# Patient Record
Sex: MALE | Race: WHITE | Employment: OTHER | URBAN - NONMETROPOLITAN AREA
[De-identification: names, ages, dates, MRNs, and addresses within clinical notes are randomized per-mention and may not be internally consistent; named-entity substitution may affect disease eponyms.]

---

## 2017-03-06 ENCOUNTER — TELEPHONE (OUTPATIENT)
Dept: CARDIOLOGY | Age: 67
End: 2017-03-06

## 2017-03-07 ENCOUNTER — TELEPHONE (OUTPATIENT)
Dept: CARDIOLOGY | Age: 67
End: 2017-03-07

## 2017-06-01 ENCOUNTER — OFFICE VISIT (OUTPATIENT)
Dept: CARDIOLOGY | Age: 67
End: 2017-06-01
Payer: MEDICARE

## 2017-06-01 VITALS
HEART RATE: 63 BPM | DIASTOLIC BLOOD PRESSURE: 88 MMHG | SYSTOLIC BLOOD PRESSURE: 136 MMHG | BODY MASS INDEX: 22.72 KG/M2 | HEIGHT: 74 IN | WEIGHT: 177 LBS

## 2017-06-01 DIAGNOSIS — E78.2 MIXED HYPERLIPIDEMIA: ICD-10-CM

## 2017-06-01 DIAGNOSIS — I48.0 PAROXYSMAL ATRIAL FIBRILLATION (HCC): Primary | ICD-10-CM

## 2017-06-01 DIAGNOSIS — I10 ESSENTIAL HYPERTENSION: ICD-10-CM

## 2017-06-01 PROCEDURE — 1036F TOBACCO NON-USER: CPT | Performed by: CLINICAL NURSE SPECIALIST

## 2017-06-01 PROCEDURE — 93000 ELECTROCARDIOGRAM COMPLETE: CPT | Performed by: CLINICAL NURSE SPECIALIST

## 2017-06-01 PROCEDURE — 3017F COLORECTAL CA SCREEN DOC REV: CPT | Performed by: CLINICAL NURSE SPECIALIST

## 2017-06-01 PROCEDURE — G8419 CALC BMI OUT NRM PARAM NOF/U: HCPCS | Performed by: CLINICAL NURSE SPECIALIST

## 2017-06-01 PROCEDURE — 4040F PNEUMOC VAC/ADMIN/RCVD: CPT | Performed by: CLINICAL NURSE SPECIALIST

## 2017-06-01 PROCEDURE — 99213 OFFICE O/P EST LOW 20 MIN: CPT | Performed by: CLINICAL NURSE SPECIALIST

## 2017-06-01 PROCEDURE — G8427 DOCREV CUR MEDS BY ELIG CLIN: HCPCS | Performed by: CLINICAL NURSE SPECIALIST

## 2017-06-01 PROCEDURE — 1123F ACP DISCUSS/DSCN MKR DOCD: CPT | Performed by: CLINICAL NURSE SPECIALIST

## 2017-06-01 ASSESSMENT — ENCOUNTER SYMPTOMS
SHORTNESS OF BREATH: 0
ORTHOPNEA: 0
VOMITING: 0
NAUSEA: 0
BLURRED VISION: 0
BLOOD IN STOOL: 0
COUGH: 0
HEARTBURN: 0

## 2017-06-09 ENCOUNTER — OFFICE VISIT (OUTPATIENT)
Dept: CARDIOLOGY | Age: 67
End: 2017-06-09
Payer: MEDICARE

## 2017-06-09 DIAGNOSIS — I48.0 PAROXYSMAL ATRIAL FIBRILLATION (HCC): Primary | ICD-10-CM

## 2017-06-09 PROCEDURE — 3017F COLORECTAL CA SCREEN DOC REV: CPT | Performed by: CLINICAL NURSE SPECIALIST

## 2017-06-09 PROCEDURE — G8428 CUR MEDS NOT DOCUMENT: HCPCS | Performed by: CLINICAL NURSE SPECIALIST

## 2017-06-09 PROCEDURE — G8419 CALC BMI OUT NRM PARAM NOF/U: HCPCS | Performed by: CLINICAL NURSE SPECIALIST

## 2017-06-09 PROCEDURE — 93000 ELECTROCARDIOGRAM COMPLETE: CPT | Performed by: CLINICAL NURSE SPECIALIST

## 2017-06-09 PROCEDURE — 99211 OFF/OP EST MAY X REQ PHY/QHP: CPT | Performed by: CLINICAL NURSE SPECIALIST

## 2017-06-09 PROCEDURE — 4040F PNEUMOC VAC/ADMIN/RCVD: CPT | Performed by: CLINICAL NURSE SPECIALIST

## 2017-08-02 DIAGNOSIS — I48.91 ATRIAL FIBRILLATION, UNSPECIFIED TYPE (HCC): ICD-10-CM

## 2017-08-02 RX ORDER — RIVAROXABAN 20 MG/1
TABLET, FILM COATED ORAL
Qty: 30 TABLET | Refills: 5 | Status: SHIPPED | OUTPATIENT
Start: 2017-08-02 | End: 2018-02-15 | Stop reason: SDUPTHER

## 2017-12-01 ENCOUNTER — OFFICE VISIT (OUTPATIENT)
Dept: CARDIOLOGY | Age: 67
End: 2017-12-01
Payer: MEDICARE

## 2017-12-01 VITALS
DIASTOLIC BLOOD PRESSURE: 80 MMHG | HEIGHT: 74 IN | BODY MASS INDEX: 24.38 KG/M2 | HEART RATE: 86 BPM | WEIGHT: 190 LBS | SYSTOLIC BLOOD PRESSURE: 130 MMHG

## 2017-12-01 DIAGNOSIS — I10 ESSENTIAL HYPERTENSION: ICD-10-CM

## 2017-12-01 DIAGNOSIS — I48.0 PAROXYSMAL ATRIAL FIBRILLATION (HCC): Primary | ICD-10-CM

## 2017-12-01 DIAGNOSIS — E78.2 MIXED HYPERLIPIDEMIA: ICD-10-CM

## 2017-12-01 PROCEDURE — 3017F COLORECTAL CA SCREEN DOC REV: CPT | Performed by: CLINICAL NURSE SPECIALIST

## 2017-12-01 PROCEDURE — 4040F PNEUMOC VAC/ADMIN/RCVD: CPT | Performed by: CLINICAL NURSE SPECIALIST

## 2017-12-01 PROCEDURE — G8427 DOCREV CUR MEDS BY ELIG CLIN: HCPCS | Performed by: CLINICAL NURSE SPECIALIST

## 2017-12-01 PROCEDURE — 99213 OFFICE O/P EST LOW 20 MIN: CPT | Performed by: CLINICAL NURSE SPECIALIST

## 2017-12-01 PROCEDURE — 1036F TOBACCO NON-USER: CPT | Performed by: CLINICAL NURSE SPECIALIST

## 2017-12-01 PROCEDURE — G8420 CALC BMI NORM PARAMETERS: HCPCS | Performed by: CLINICAL NURSE SPECIALIST

## 2017-12-01 PROCEDURE — G8484 FLU IMMUNIZE NO ADMIN: HCPCS | Performed by: CLINICAL NURSE SPECIALIST

## 2017-12-01 PROCEDURE — 1123F ACP DISCUSS/DSCN MKR DOCD: CPT | Performed by: CLINICAL NURSE SPECIALIST

## 2017-12-01 ASSESSMENT — ENCOUNTER SYMPTOMS
SHORTNESS OF BREATH: 0
ORTHOPNEA: 0
BLOOD IN STOOL: 0
HEARTBURN: 0
VOMITING: 0
BLURRED VISION: 0
NAUSEA: 0
COUGH: 0

## 2017-12-01 NOTE — PATIENT INSTRUCTIONS
Followup With Dr. Marcial Joyner in 6mo  May take antihistamines for sinus congestion/ allergies such as Zyrtec, Claritin, Allegra, and Benadryl. Avoid decongestants such as Sudafed that may elevate blood pressure and heart rate. Consider seeing an allergist in the future    Call with any questions or concerns  Follow up with Garrett Patel, DO for non cardiac problems  Report any new problems  Cardiovascular Fitness-Exercise as tolerated. Strive for 15 minutes of exercise most days of the week. Cardiac / Healthy Diet  Continue current medications as directed  Continue plan of treatment  It is always recommended that you bring your medications bottles with you to each visit - this is for your safety! Patient Education        Atrial Fibrillation: Care Instructions  Your Care Instructions    Atrial fibrillation is an irregular and often fast heartbeat. Treating this condition is important for several reasons. It can cause blood clots, which can travel from your heart to your brain and cause a stroke. If you have a fast heartbeat, you may feel lightheaded, dizzy, and weak. An irregular heartbeat can also increase your risk for heart failure. Atrial fibrillation is often the result of another heart condition, such as high blood pressure or coronary artery disease. Making changes to improve your heart condition will help you stay healthy and active. Follow-up care is a key part of your treatment and safety. Be sure to make and go to all appointments, and call your doctor if you are having problems. It's also a good idea to know your test results and keep a list of the medicines you take. How can you care for yourself at home? Medicines  · Take your medicines exactly as prescribed. Call your doctor if you think you are having a problem with your medicine. You will get more details on the specific medicines your doctor prescribes.   · If your doctor has given you a blood thinner to prevent a stroke, be sure [employer]. Care instructions adapted under license by [facility] ([facility]). If you have questions about a medical condition or this instruction, always ask your healthcare professional. [address] any warranty or liability for your use of this information.

## 2017-12-01 NOTE — PROGRESS NOTES
[facility], [address] [place] [address]  Phone: [phone]  Fax: [phone]    OFFICE VISIT:  2017    Consuelo Franco - : 1950    Reason For Visit:  Lazarus Johnson is a 69 y.o. male who is here for 6 Month Follow-Up (pt states no cardiac symptoms ) and Atrial Fibrillation    HPI  Patient is here for follow-up with a history of paroxysmal atrial fibrillation. He is anticoagulated and denies any bleeding issues. He is on no antiarrhythmics, but rather digoxin. He denies any palpitations or fast heart rates other than one episode in  when he came to our office for an EKG. He had been using over-the-counter medications for severe allergy issues. He states he's had no further episodes. He denies any chest pain, unusual dyspnea, fatigue, orthopnea, PND, edema, or syncope     Cristian Cruz, DO is PCP.   Consuel[facility] the [facility]:    Patient Active Problem List    Diagnosis Date Noted    A-fib [facility])     HTN (hypertension)     Hyperlipidemia      Past Medical History:   Diagnosis Date    [facility]     2015 new onset    Allergic rhinitis     Reyes esophagus     GERD (gastroesophageal reflux disease)     HTN (hypertension)     Hyperlipidemia      Past Surgical History:   Procedure Laterality Date    APPENDECTOMY      COLONOSCOPY      FOOT SURGERY Left     TESTICLE SURGERY       Family History   Problem Relation Age of Onset    Heart Disease Paternal Grandmother     Heart Disease Father      Social History   Substance Use Topics    Smoking status: Never Smoker    Smokeless tobacco: Never Used    Alcohol use No      Current Outpatient Prescriptions   Medication Sig Dispense Refill    XARELTO 20 MG TABS tablet TAKE ONE TABLET BY MOUTH ONCE DAILY WITH BREAKFAST 30 tablet 5    digoxin (LANOXIN) 250 MCG tablet Take 1 tablet by mouth daily 90 tablet 3    lisinopril (PRINIVIL;ZESTRIL) 5 MG tablet Take 1 tablet by mouth daily 90 tablet 3    tamsulosin (FLOMAX) 0.4 MG capsule Take 0.4 mg by mouth daily      pantoprazole (PROTONIX) 40 MG tablet Take 40 mg by mouth daily      Multiple Vitamins-Minerals (THERAPEUTIC MULTIVITAMIN-MINERALS) tablet Take 1 tablet by mouth daily       No current facility-administered medications for this visit. Allergies: Statins    Review of Systems  Review of Systems   Constitutional: Negative for chills, fever and malaise/fatigue. HENT: Positive for congestion (sinus). Negative for nosebleeds. Eyes: Negative for blurred vision. Respiratory: Negative for cough and shortness of breath. Cardiovascular: Negative for chest pain, palpitations, orthopnea, leg swelling and PND. Gastrointestinal: Negative for blood in stool, heartburn, melena, nausea and vomiting. Musculoskeletal: Negative for falls and myalgias. Skin: Negative for rash. Neurological: Negative for dizziness, sensory change, speech change, focal weakness and headaches. Endo/Heme/Allergies: Does not bruise/bleed easily. Psychiatric/Behavioral: Negative for depression. The patient is not nervous/anxious. Objective  Vital Signs - /80   Pulse 86   Ht 6' 2\" (1.88 m)   Wt 190 lb (86.2 kg)   BMI 24.39 kg/m²   Physical Exam   Constitutional: He is oriented to person, place, and time. He appears well-developed and well-nourished. No distress. HENT:   Head: Normocephalic and atraumatic. Eyes: Pupils are equal, round, and reactive to light. Right eye exhibits no discharge. Left eye exhibits no discharge. Neck: No JVD present. No tracheal deviation present. Cardiovascular: Normal rate, regular rhythm, normal heart sounds and intact distal pulses. Exam reveals no gallop and no friction rub. No murmur heard. No carotid bruit   Pulmonary/Chest: Effort normal and breath sounds normal. No respiratory distress. He has no wheezes. He has no rales. Abdominal: Soft.  There is no tenderness. Musculoskeletal: He exhibits no edema. Neurological: He is alert and oriented to person, place, and time. No cranial nerve deficit. Skin: Skin is warm and dry. No rash noted. Psychiatric: He has a normal mood and affect. His behavior is normal. Judgment normal.   Nursing note and vitals reviewed. Assessment:    1. Paroxysmal atrial fibrillation (HCC)     2. Essential hypertension     3. Mixed hyperlipidemia       Patient is taking medications as prescribed    Paroxysmal atrial fibrillation-patient is currently well-controlled with digoxin and is anticoagulated with Xarelto. He denies any bleeding issues. We had a long discussion about over-the-counter cough and cold medications reviewed he needs to avoid decongestants as they can elevated heart rate and blood pressure. Hypertension-stable  Hyperlipidemia-managed by PCP and well controlled per patient report    Stable cardiovascular status. No evidence of overt heart failure, angina or dysrhythmia. Plan    Followup With Dr. Luis F Vincent in 6mo  May take antihistamines for sinus congestion/ allergies such as Zyrtec, Claritin, Allegra, and Benadryl. Avoid decongestants such as Sudafed that may elevate blood pressure and heart rate. Consider seeing an allergist in the future    Call with any questions or concerns  Follow up with Brynn Almeida,  for non cardiac problems  Report any new problems  Cardiovascular Fitness-Exercise as tolerated. Strive for 15 minutes of exercise most days of the week. Cardiac / Healthy Diet  Continue current medications as directed  Continue plan of treatment  It is always recommended that you bring your medications bottles with you to each visit - this is for your safety!        JULIA Torres

## 2017-12-15 ENCOUNTER — HOSPITAL ENCOUNTER (OUTPATIENT)
Dept: GENERAL RADIOLOGY | Facility: HOSPITAL | Age: 67
Discharge: HOME | End: 2017-12-15
Attending: FAMILY MEDICINE | Admitting: FAMILY MEDICINE

## 2017-12-15 ENCOUNTER — TRANSCRIBE ORDERS (OUTPATIENT)
Dept: ADMINISTRATIVE | Facility: HOSPITAL | Age: 67
End: 2017-12-15

## 2017-12-15 DIAGNOSIS — R52 PAIN: ICD-10-CM

## 2017-12-15 DIAGNOSIS — R52 PAIN: Primary | ICD-10-CM

## 2017-12-15 PROCEDURE — 73564 X-RAY EXAM KNEE 4 OR MORE: CPT

## 2018-02-15 DIAGNOSIS — I48.91 ATRIAL FIBRILLATION, UNSPECIFIED TYPE (HCC): ICD-10-CM

## 2018-02-15 RX ORDER — RIVAROXABAN 20 MG/1
TABLET, FILM COATED ORAL
Qty: 90 TABLET | Refills: 3 | Status: SHIPPED | OUTPATIENT
Start: 2018-02-15 | End: 2019-05-01 | Stop reason: SDUPTHER

## 2018-02-28 DIAGNOSIS — I48.91 ATRIAL FIBRILLATION, UNSPECIFIED TYPE (HCC): ICD-10-CM

## 2018-02-28 RX ORDER — DIGOXIN 250 MCG
TABLET ORAL
Qty: 90 TABLET | Refills: 3 | Status: SHIPPED | OUTPATIENT
Start: 2018-02-28 | End: 2019-05-10 | Stop reason: SDUPTHER

## 2018-04-26 ENCOUNTER — OFFICE VISIT (OUTPATIENT)
Dept: CARDIOLOGY | Age: 68
End: 2018-04-26
Payer: MEDICARE

## 2018-04-26 VITALS
HEIGHT: 74 IN | WEIGHT: 190 LBS | BODY MASS INDEX: 24.38 KG/M2 | DIASTOLIC BLOOD PRESSURE: 74 MMHG | SYSTOLIC BLOOD PRESSURE: 138 MMHG | HEART RATE: 68 BPM

## 2018-04-26 DIAGNOSIS — I10 ESSENTIAL HYPERTENSION: ICD-10-CM

## 2018-04-26 DIAGNOSIS — I48.0 PAROXYSMAL ATRIAL FIBRILLATION (HCC): Primary | ICD-10-CM

## 2018-04-26 PROCEDURE — 1123F ACP DISCUSS/DSCN MKR DOCD: CPT | Performed by: INTERNAL MEDICINE

## 2018-04-26 PROCEDURE — 3017F COLORECTAL CA SCREEN DOC REV: CPT | Performed by: INTERNAL MEDICINE

## 2018-04-26 PROCEDURE — G8420 CALC BMI NORM PARAMETERS: HCPCS | Performed by: INTERNAL MEDICINE

## 2018-04-26 PROCEDURE — 1036F TOBACCO NON-USER: CPT | Performed by: INTERNAL MEDICINE

## 2018-04-26 PROCEDURE — 99213 OFFICE O/P EST LOW 20 MIN: CPT | Performed by: INTERNAL MEDICINE

## 2018-04-26 PROCEDURE — 4040F PNEUMOC VAC/ADMIN/RCVD: CPT | Performed by: INTERNAL MEDICINE

## 2018-04-26 PROCEDURE — G8427 DOCREV CUR MEDS BY ELIG CLIN: HCPCS | Performed by: INTERNAL MEDICINE

## 2018-05-04 ENCOUNTER — OFFICE VISIT (OUTPATIENT)
Dept: GASTROENTEROLOGY | Facility: CLINIC | Age: 68
End: 2018-05-04

## 2018-05-04 VITALS
HEART RATE: 85 BPM | HEIGHT: 74 IN | SYSTOLIC BLOOD PRESSURE: 128 MMHG | BODY MASS INDEX: 24.38 KG/M2 | WEIGHT: 190 LBS | DIASTOLIC BLOOD PRESSURE: 80 MMHG | OXYGEN SATURATION: 99 %

## 2018-05-04 DIAGNOSIS — K21.00 GASTROESOPHAGEAL REFLUX DISEASE WITH ESOPHAGITIS: ICD-10-CM

## 2018-05-04 DIAGNOSIS — I10 HTN (HYPERTENSION), BENIGN: ICD-10-CM

## 2018-05-04 DIAGNOSIS — K22.70 BARRETT'S ESOPHAGUS WITHOUT DYSPLASIA: Primary | ICD-10-CM

## 2018-05-04 PROCEDURE — 99213 OFFICE O/P EST LOW 20 MIN: CPT | Performed by: CLINICAL NURSE SPECIALIST

## 2018-05-04 RX ORDER — DIGOXIN 250 MCG
TABLET ORAL
COMMUNITY
Start: 2018-02-28 | End: 2022-12-15

## 2018-05-04 RX ORDER — PANTOPRAZOLE SODIUM 40 MG/1
40 TABLET, DELAYED RELEASE ORAL
COMMUNITY

## 2018-05-04 RX ORDER — TAMSULOSIN HYDROCHLORIDE 0.4 MG/1
0.4 CAPSULE ORAL
COMMUNITY
End: 2019-07-16 | Stop reason: SDUPTHER

## 2018-05-04 RX ORDER — LISINOPRIL 5 MG/1
5 TABLET ORAL
COMMUNITY
Start: 2016-12-01 | End: 2023-01-23

## 2018-05-04 RX ORDER — M-VIT,TX,IRON,MINS/CALC/FOLIC 27MG-0.4MG
TABLET ORAL
COMMUNITY

## 2018-05-04 NOTE — PROGRESS NOTES
Chief Complaint   Patient presents with   • Endoscopy     Subjective   HPI  Bobby Christina is a 67 y.o. male who presents with hx of Barretts esophagus determined by biopsy. Course is constant.  Disease is stable , maintains on Protonix for the management of this. Last Endoscopy reviewed. He has no complaints of nausea or vomiting. No change in bowels. No wt loss. No BRBPR. No melena. No abdominal pain or dysphagia.  Past Medical History:   Diagnosis Date   • Allergic rhinitis    • Arthritis    • Garza's esophagus    • GERD (gastroesophageal reflux disease)    • Hx of colonic polyps    • Hyperlipidemia      Past Surgical History:   Procedure Laterality Date   • COLONOSCOPY W/ POLYPECTOMY  01/19/2015    Tubular adenomatous polyp ascending colon, Polyp at 40 cm insufficient tissue for diagnosis, Diverticulosis repeat exam in 5 years   • ENDOSCOPY  03/23/2015    Intestinal Metaplasia mild to moderate chronic inflammation, HH repeat exam in 3 years   • UPPER GASTROINTESTINAL ENDOSCOPY  12/13/2010    HH Barretts         Current Outpatient Prescriptions:   •  digoxin (LANOXIN) 250 MCG tablet, TAKE ONE TABLET BY MOUTH ONCE DAILY, Disp: , Rfl:   •  lisinopril (PRINIVIL,ZESTRIL) 5 MG tablet, Take 5 mg by mouth., Disp: , Rfl:   •  pantoprazole (PROTONIX) 40 MG EC tablet, Take 40 mg by mouth., Disp: , Rfl:   •  rivaroxaban (XARELTO) 20 MG tablet, TAKE ONE TABLET BY MOUTH ONCE DAILY WITH BREAKFAST, Disp: , Rfl:   •  tamsulosin (FLOMAX) 0.4 MG capsule 24 hr capsule, Take 0.4 mg by mouth., Disp: , Rfl:   •  therapeutic multivitamin-minerals (THERAGRAN-M) tablet, Take  by mouth., Disp: , Rfl:   No Known Allergies  Social History     Social History   • Marital status:      Spouse name: N/A   • Number of children: N/A   • Years of education: N/A     Occupational History   • Not on file.     Social History Main Topics   • Smoking status: Never Smoker   • Smokeless tobacco: Never Used   • Alcohol use Yes      Comment:  Moderate   • Drug use: Unknown   • Sexual activity: Not on file     Other Topics Concern   • Not on file     Social History Narrative   • No narrative on file     Family History   Problem Relation Age of Onset   • Colon polyps Brother    • Ulcerative colitis Brother    • Colon cancer Neg Hx      Health Maintenance   Topic Date Due   • TDAP/TD VACCINES (1 - Tdap) 06/26/1967   • PNEUMOCOCCAL VACCINES (65+ LOW/MEDIUM RISK) (1 of 2 - PCV13) 06/26/2013   • HEPATITIS C SCREENING  11/28/2017   • ZOSTER VACCINE  11/28/2017   • LIPID PANEL  05/04/2018   • INFLUENZA VACCINE  08/01/2018   • MEDICARE ANNUAL WELLNESS  12/14/2018   • COLONOSCOPY  01/19/2025     Review of Systems   Constitutional: Negative for activity change, appetite change, chills, diaphoresis, fatigue, fever and unexpected weight change.   HENT: Negative for ear pain, hearing loss, mouth sores, sore throat, trouble swallowing and voice change.    Eyes: Negative.    Respiratory: Negative for cough, choking, shortness of breath and wheezing.    Cardiovascular: Negative for chest pain and palpitations.   Gastrointestinal: Negative for abdominal pain, blood in stool, constipation, diarrhea, nausea and vomiting.   Endocrine: Negative for cold intolerance and heat intolerance.   Genitourinary: Negative for decreased urine volume, dysuria, frequency, hematuria and urgency.   Musculoskeletal: Negative for back pain, gait problem and myalgias.   Skin: Negative for color change, pallor and rash.   Allergic/Immunologic: Negative for food allergies and immunocompromised state.   Neurological: Negative for dizziness, tremors, seizures, syncope, weakness, light-headedness, numbness and headaches.   Hematological: Negative for adenopathy. Does not bruise/bleed easily.   Psychiatric/Behavioral: Negative for agitation and confusion. The patient is not nervous/anxious.    All other systems reviewed and are negative.    Objective   Vitals:    05/04/18 0846   BP: 128/80   Pulse:  "85   SpO2: 99%   Weight: 86.2 kg (190 lb)   Height: 188 cm (74\")     Body mass index is 24.39 kg/m².    Physical Exam   Constitutional: He is oriented to person, place, and time. He appears well-developed and well-nourished.   HENT:   Head: Normocephalic and atraumatic.   Eyes: Pupils are equal, round, and reactive to light.   Neck: Normal range of motion. Neck supple. No tracheal deviation present.   Cardiovascular: Normal rate, regular rhythm and normal heart sounds.  Exam reveals no gallop and no friction rub.    No murmur heard.  Pulmonary/Chest: Effort normal and breath sounds normal. No respiratory distress. He has no wheezes. He has no rales. He exhibits no tenderness.   Abdominal: Soft. Bowel sounds are normal. He exhibits no distension. There is no hepatosplenomegaly. There is no tenderness. There is no rigidity, no rebound and no guarding.   Musculoskeletal: Normal range of motion. He exhibits no edema, tenderness or deformity.   Neurological: He is alert and oriented to person, place, and time. He has normal reflexes.   Skin: Skin is warm and dry. No rash noted. No pallor.   Psychiatric: He has a normal mood and affect. His behavior is normal. Judgment and thought content normal.       Assessment/Plan   Bobby was seen today for endoscopy.    Diagnoses and all orders for this visit:    Garza's esophagus without dysplasia  -     Case Request; Standing  -     Implement Anesthesia Orders Day of Procedure; Standing  -     Obtain Informed Consent; Standing  -     Case Request    Gastroesophageal reflux disease with esophagitis    HTN (hypertension), benign  Comments:  cont BP medication the day of procedure    Xarelto to hold per Dr. Olivo he takes due to risk factors    ESOPHAGOGASTRODUODENOSCOPY WITH ANESTHESIA (N/A)  Patient's Body mass index is 24.39 kg/m². BMI is within normal parameters. No follow-up required.      Bia Bhagat, APRN  5/4/2018  9:23 AM      IF YOU SMOKE OR USE TOBACCO PLEASE " READ THE FOLLOWING:   3.5 minutes provided    Why is smoking bad for me?  Smoking increases the risk of heart disease, lung disease, vascular disease, stroke, and cancer.     If you smoke, STOP!    If you would like more information on quitting smoking, please visit the [employer] website: [email or web address]   This link will provide additional resources including the QUIT line and the Beat the Pack support groups.     For more information:    Quit Now [place]  1-800-QUIT-NOW  https://[email or web address].org/en-US/    Obesity, Adult  Obesity is having too much body fat. If you have a BMI of 30 or more, you are obese. BMI is a number that explains how much body fat you have. Obesity is often caused by taking in (consuming) more calories than your body uses.  Obesity can cause serious health problems. Changing your lifestyle can help to treat obesity.  Follow these instructions at home:  Eating and drinking     · Follow advice from your doctor about what to eat and drink. Your doctor may tell you to:  ¨ Cut down on (limit) fast foods, sweets, and processed snack foods.  ¨ Choose low-fat options. For example, choose low-fat milk instead of whole milk.  ¨ Eat 5 or more servings of fruits or vegetables every day.  ¨ Eat at home more often. This gives you more control over what you eat.  ¨ Choose healthy foods when you eat out.  ¨ Learn what a healthy portion size is. A portion size is the amount of a certain food that is healthy for you to eat at one time. This is different for each person.  ¨ Keep low-fat snacks available.  ¨ Avoid sugary drinks. These include soda, fruit juice, iced tea that is sweetened with sugar, and flavored milk.  ¨ Eat a healthy breakfast.  · Drink enough water to keep your pee (urine) clear or pale yellow.  · Do not go without eating for long periods of time (do not fast).  · Do not go on popular or trendy diets (fad diets).  Physical Activity    · Exercise often, as told by your doctor. Ask your doctor:  ¨ What types of exercise are safe for you.  ¨ How often you should exercise.  · Warm up and stretch before being active.  · Do slow stretching after being active (cool down).  · Rest between times of being active.  Lifestyle   · Limit how much time you spend in front of your TV, computer, or video game system (be less sedentary).  · Find ways to reward yourself that do not involve food.  · Limit alcohol intake to no more than 1 drink a day for nonpregnant women and 2 drinks a day for men. One drink equals 12 oz of beer, 5 oz of wine, or 1½ oz of hard liquor.  General instructions   · Keep a weight loss journal. This can help you keep track of:  ¨ The food that you eat.  ¨ The exercise that you do.  · Take over-the-counter and prescription medicines only as told by your doctor.  · Take vitamins and supplements only as told by your doctor.  · Think about joining a support group. Your doctor may be able to help with this.  · Keep all follow-up visits as told by your doctor. This is important.  Contact a doctor if:  · You cannot meet your weight loss goal after you have changed your diet and lifestyle for 6 weeks.  This information is not intended to replace advice given to you by your health care provider. Make sure you discuss any questions you have with your health care provider.  Document Released: 03/11/2013 Document Revised: 05/25/2017 Document Reviewed: 10/05/2016  [employer] Interactive Patient Education © 2017 [employer]

## 2018-05-09 ENCOUNTER — TELEPHONE (OUTPATIENT)
Dept: CARDIOLOGY | Age: 68
End: 2018-05-09

## 2018-07-05 ENCOUNTER — ANESTHESIA (OUTPATIENT)
Dept: GASTROENTEROLOGY | Facility: HOSPITAL | Age: 68
End: 2018-07-05

## 2018-07-05 ENCOUNTER — HOSPITAL ENCOUNTER (OUTPATIENT)
Facility: HOSPITAL | Age: 68
Setting detail: HOSPITAL OUTPATIENT SURGERY
Discharge: HOME | End: 2018-07-05
Attending: INTERNAL MEDICINE | Admitting: ANESTHESIOLOGY

## 2018-07-05 ENCOUNTER — ANESTHESIA EVENT (OUTPATIENT)
Dept: GASTROENTEROLOGY | Facility: HOSPITAL | Age: 68
End: 2018-07-05

## 2018-07-05 VITALS
DIASTOLIC BLOOD PRESSURE: 58 MMHG | HEIGHT: 73 IN | TEMPERATURE: 98.1 F | OXYGEN SATURATION: 98 % | SYSTOLIC BLOOD PRESSURE: 128 MMHG | WEIGHT: 184 LBS | HEART RATE: 72 BPM | BODY MASS INDEX: 24.39 KG/M2 | RESPIRATION RATE: 20 BRPM

## 2018-07-05 DIAGNOSIS — K22.70 BARRETT'S ESOPHAGUS WITHOUT DYSPLASIA: ICD-10-CM

## 2018-07-05 PROCEDURE — G0463 HOSPITAL OUTPT CLINIC VISIT: HCPCS | Performed by: INTERNAL MEDICINE

## 2018-07-05 RX ORDER — SODIUM CHLORIDE 0.9 % (FLUSH) 0.9 %
3 SYRINGE (ML) INJECTION AS NEEDED
Status: DISCONTINUED | OUTPATIENT
Start: 2018-07-05 | End: 2018-07-05 | Stop reason: HOSPADM

## 2018-07-05 RX ORDER — SODIUM CHLORIDE 9 MG/ML
500 INJECTION, SOLUTION INTRAVENOUS CONTINUOUS PRN
Status: DISCONTINUED | OUTPATIENT
Start: 2018-07-05 | End: 2018-07-05 | Stop reason: HOSPADM

## 2018-07-05 NOTE — NURSING NOTE
Pt did not stop xarelto in the 48 hour time period, spoke with Dr. Swann he stated that pt needed to reschedule.

## 2018-07-05 NOTE — ANESTHESIA PREPROCEDURE EVALUATION
Anesthesia Evaluation     Patient summary reviewed   no history of anesthetic complications:  NPO Solid Status: > 8 hours             Airway   Mallampati: II  TM distance: >3 FB  Neck ROM: full  Dental      Pulmonary - negative pulmonary ROS   Cardiovascular   Exercise tolerance: excellent (>7 METS)    (+) hypertension, dysrhythmias Atrial Fib, hyperlipidemia,       Neuro/Psych- negative ROS  GI/Hepatic/Renal/Endo    (+)  GERD,      Musculoskeletal     Abdominal    Substance History      OB/GYN          Other                        Anesthesia Plan    ASA 3     general     intravenous induction   Anesthetic plan and risks discussed with patient.

## 2018-10-29 ENCOUNTER — OFFICE VISIT (OUTPATIENT)
Dept: CARDIOLOGY | Age: 68
End: 2018-10-29
Payer: MEDICARE

## 2018-10-29 VITALS
WEIGHT: 190 LBS | HEIGHT: 74 IN | BODY MASS INDEX: 24.38 KG/M2 | HEART RATE: 65 BPM | DIASTOLIC BLOOD PRESSURE: 80 MMHG | SYSTOLIC BLOOD PRESSURE: 138 MMHG

## 2018-10-29 DIAGNOSIS — I10 ESSENTIAL HYPERTENSION: ICD-10-CM

## 2018-10-29 DIAGNOSIS — I48.0 PAROXYSMAL ATRIAL FIBRILLATION (HCC): Primary | ICD-10-CM

## 2018-10-29 DIAGNOSIS — Z79.01 CHRONIC ANTICOAGULATION: ICD-10-CM

## 2018-10-29 PROCEDURE — 99214 OFFICE O/P EST MOD 30 MIN: CPT | Performed by: NURSE PRACTITIONER

## 2018-10-29 PROCEDURE — 3017F COLORECTAL CA SCREEN DOC REV: CPT | Performed by: NURSE PRACTITIONER

## 2018-10-29 PROCEDURE — 93000 ELECTROCARDIOGRAM COMPLETE: CPT | Performed by: NURSE PRACTITIONER

## 2018-10-29 PROCEDURE — G8484 FLU IMMUNIZE NO ADMIN: HCPCS | Performed by: NURSE PRACTITIONER

## 2018-10-29 PROCEDURE — G8420 CALC BMI NORM PARAMETERS: HCPCS | Performed by: NURSE PRACTITIONER

## 2018-10-29 PROCEDURE — G8427 DOCREV CUR MEDS BY ELIG CLIN: HCPCS | Performed by: NURSE PRACTITIONER

## 2018-10-29 PROCEDURE — 1101F PT FALLS ASSESS-DOCD LE1/YR: CPT | Performed by: NURSE PRACTITIONER

## 2018-10-29 NOTE — PROGRESS NOTES
arteries and kidneys. 2) Control cholesterol - contributes to plaque, which can clog arteries and lead to heart disease and stroke. When you control your cholesterol you are giving your arteries their best chance to remain clear. 3) Reduce blood sugar - most of the food we eat is turning into glucose or blood sugar that our body uses for energy. Over time, high levels of blood sugar can damage your heart, kidneys, eyes and nerves. 4) Get active - living an active life is one of the most rewarding gifts you can give yourself and those you love. Simply put, daily physical activity increases your length and quality of life. 5)  Eat better - A healthy diet is one of your best weapons for fighting cardiovascular disease. When you eat a heart healthy diet, you improve your chances for feeling good and staying healthy for life. 6)  Lose weight - when you shed extra fat an unnecessary pounds, you reduce the burden on your hear, lungs, blood vessels and skeleton. You give yourself the gift of active living, you lower your blood pressure and help yourself feel better. 7) Stop smoking - cigarette smokers have a higher risk of developing cardiovascular disease. If  You smoke, quitting is the best thing you can do for your health. Check American Heart Association on line for more information on Life's Simple 7 and tips for healthy living.      JULIA Dick

## 2018-12-04 ENCOUNTER — TELEPHONE (OUTPATIENT)
Dept: GASTROENTEROLOGY | Facility: CLINIC | Age: 68
End: 2018-12-04

## 2018-12-21 ENCOUNTER — TELEPHONE (OUTPATIENT)
Dept: UROLOGY | Facility: CLINIC | Age: 68
End: 2018-12-21

## 2018-12-21 NOTE — TELEPHONE ENCOUNTER
Deann was returning Gavin's phone call and had left a message on the nurse line about the patient. Gavin had called needing a verbal PSA on the patient and Deann said the PSA was 5.3 on 12/13/18. Deann said that she would fax over the results today.

## 2018-12-26 ENCOUNTER — OFFICE VISIT (OUTPATIENT)
Dept: UROLOGY | Facility: CLINIC | Age: 68
End: 2018-12-26

## 2018-12-26 VITALS
HEIGHT: 73 IN | DIASTOLIC BLOOD PRESSURE: 78 MMHG | SYSTOLIC BLOOD PRESSURE: 150 MMHG | WEIGHT: 194 LBS | BODY MASS INDEX: 25.71 KG/M2 | TEMPERATURE: 97.6 F

## 2018-12-26 DIAGNOSIS — N40.0 BENIGN PROSTATIC HYPERPLASIA WITHOUT LOWER URINARY TRACT SYMPTOMS: ICD-10-CM

## 2018-12-26 DIAGNOSIS — R97.20 ELEVATED PROSTATE SPECIFIC ANTIGEN (PSA): Primary | ICD-10-CM

## 2018-12-26 LAB
BILIRUB BLD-MCNC: NEGATIVE MG/DL
CLARITY, POC: CLEAR
COLOR UR: YELLOW
GLUCOSE UR STRIP-MCNC: NEGATIVE MG/DL
KETONES UR QL: NEGATIVE
LEUKOCYTE EST, POC: NEGATIVE
NITRITE UR-MCNC: NEGATIVE MG/ML
PH UR: 7 [PH] (ref 5–8)
PROT UR STRIP-MCNC: ABNORMAL MG/DL
RBC # UR STRIP: NEGATIVE /UL
SP GR UR: 1.02 (ref 1–1.03)
UROBILINOGEN UR QL: NORMAL

## 2018-12-26 PROCEDURE — 81003 URINALYSIS AUTO W/O SCOPE: CPT | Performed by: UROLOGY

## 2018-12-26 PROCEDURE — 99204 OFFICE O/P NEW MOD 45 MIN: CPT | Performed by: UROLOGY

## 2018-12-26 NOTE — PATIENT INSTRUCTIONS

## 2018-12-26 NOTE — PROGRESS NOTES
Mr. Christina is 68 y.o. male    Chief Complaint   Patient presents with   • Elevated PSA   • Benign Prostatic Hypertrophy       History of Present Illness  Elevated PSA  Patient is here with an elevated PSA. He has no personal history of prostate cancer. He has no prior genitourinary history of previous  surgery.  Previous PSA values are :   5.3   He reports frequency. He denies straining. Patient states symptoms are of moderate severity. Onset of symptoms was unknown years ago and was gradual in onset.    The following portions of the patient's history were reviewed and updated as appropriate: allergies, current medications, past family history, past medical history, past social history, past surgical history and problem list.    Review of Systems   Constitutional: Negative for appetite change and fever.   HENT: Negative for hearing loss and sore throat.    Eyes: Negative for pain and redness.   Respiratory: Negative for cough and shortness of breath.    Cardiovascular: Negative for chest pain and leg swelling.   Gastrointestinal: Negative for anal bleeding, nausea and vomiting.   Endocrine: Negative for cold intolerance and heat intolerance.   Genitourinary: Negative for dysuria, flank pain, frequency, hematuria and urgency.   Musculoskeletal: Negative for joint swelling and myalgias.   Skin: Negative for color change and rash.   Allergic/Immunologic: Negative for food allergies and immunocompromised state.   Neurological: Negative for dizziness and speech difficulty.   Hematological: Negative for adenopathy. Does not bruise/bleed easily.   Psychiatric/Behavioral: Negative for dysphoric mood and suicidal ideas.         Current Outpatient Medications:   •  digoxin (LANOXIN) 250 MCG tablet, TAKE ONE TABLET BY MOUTH ONCE DAILY, Disp: , Rfl:   •  lisinopril (PRINIVIL,ZESTRIL) 5 MG tablet, Take 5 mg by mouth., Disp: , Rfl:   •  pantoprazole (PROTONIX) 40 MG EC tablet, Take 40 mg by mouth., Disp: , Rfl:   •  rivaroxaban  "(XARELTO) 20 MG tablet, TAKE ONE TABLET BY MOUTH ONCE DAILY WITH BREAKFAST, Disp: , Rfl:   •  tamsulosin (FLOMAX) 0.4 MG capsule 24 hr capsule, Take 0.4 mg by mouth., Disp: , Rfl:   •  therapeutic multivitamin-minerals (THERAGRAN-M) tablet, Take  by mouth., Disp: , Rfl:     Past Medical History:   Diagnosis Date   • Allergic rhinitis    • Arthritis    • Garza's esophagus    • GERD (gastroesophageal reflux disease)    • Hx of colonic polyps    • Hyperlipidemia        Past Surgical History:   Procedure Laterality Date   • COLONOSCOPY W/ POLYPECTOMY  01/19/2015    Tubular adenomatous polyp ascending colon, Polyp at 40 cm insufficient tissue for diagnosis, Diverticulosis repeat exam in 5 years   • ENDOSCOPY  03/23/2015    Intestinal Metaplasia mild to moderate chronic inflammation, HH repeat exam in 3 years   • UPPER GASTROINTESTINAL ENDOSCOPY  12/13/2010    [facility]       Social History     Socioeconomic History   • Marital status:      Spouse name: Not on file   • Number of children: Not on file   • Years of education: Not on file   • Highest education level: Not on file   Tobacco Use   • Smoking status: Never Smoker   • Smokeless tobacco: Never Used   Substance and Sexual Activity   • Alcohol use: Yes     Comment: Moderate   • Drug use: No       Family History   Problem Relation Age of Onset   • Colon polyps Brother    • Ulcerative colitis Brother    • Colon cancer Neg Hx        Objective    /78   Temp 97.6 °F (36.4 °C)   Ht 185.4 cm (73\")   Wt 88 kg (194 lb)   BMI 25.60 kg/m²     Physical Exam  Constitutional: Well nourished, Well developed; No apparent distress.  His vital signs are reviewed  Psychiatric: Appropriate affect; Alert and oriented  Eyes: Unremarkable  Musculoskeletal: Normal gait and station  GI: Abdomen is soft, non-tender  Respiratory: No distress; Unlabored movement; No accessory musculature needed with symmetric movements  Skin: No pallor or diaphoresis  ; Penis and " testicles are normal; Prostate 40-50 mL without nodule        Hospital Outpatient Visit on 02/26/2015   Component Date Value Ref Range Status   • TSH 02/26/2015 4.14  0.47 - 4.68 mIU/mL Final   • Sodium 02/26/2015 141  135 - 145 mmol/L Final   • Potassium 02/26/2015 4.5  3.5 - 5.3 mmol/L Final   • Chloride 02/26/2015 106  98 - 110 mmol/L Final   • CO2 02/26/2015 27  24 - 31 mmol/L Final   • Glucose 02/26/2015 96  70 - 100 mg/dL Final   • BUN 02/26/2015 11  5 - 21 mg/dL Final   • Creatinine 02/26/2015 0.89  0.5 - 1.4 mg/dL Final   • Calcium 02/26/2015 9.5  8.4 - 10.4 mg/dL Final   • Anion Gap 02/26/2015 9  4 - 13 mmol/L Final   • eGFR 02/26/2015 >60  ml/min/1.732 Final    Comment: DF by IF @ 02/26/2015 18:14  GFR Normal                            >60  Chronic Kidney Disease          <60  Kidney Failure                         <15         Results for orders placed or performed in visit on 12/26/18   POC Urinalysis Dipstick, Multipro   Result Value Ref Range    Color Yellow Yellow, Straw, Dark Yellow, Deann    Clarity, UA Clear Clear    Glucose, UA Negative Negative, 1000 mg/dL (3+) mg/dL    Bilirubin Negative Negative    Ketones, UA Negative Negative    Specific Gravity  1.020 1.005 - 1.030    Blood, UA Negative Negative    pH, Urine 7.0 5.0 - 8.0    Protein, POC 30 mg/dL (A) Negative mg/dL    Urobilinogen, UA Normal Normal    Nitrite, UA Negative Negative    Leukocytes Negative Negative     Patient's Body mass index is 25.6 kg/m². BMI is above normal parameters. Recommendations include: educational material.    Assessment and Plan    Bobby was seen today for elevated psa and benign prostatic hypertrophy.    Diagnoses and all orders for this visit:    Elevated prostate specific antigen (PSA)  -     POC Urinalysis Dipstick, Multipro  -     PSA DIAGNOSTIC; Future    Benign prostatic hyperplasia without lower urinary tract symptoms    Reviewed his outside records.  This is a 68-year-old gentleman with a history of BPH  on Flomax had a PSA of 5.3.  To his knowledge this is the first time elevation of his PSA.    Today we discussed the meaning of PSA in relation to prostate cancer.  We discussed options including proceeding with a prostate biopsy versus repeating his PSA.  Because he is on a blood thinner he is at a somewhat higher risk of complications surrounding the prostate biopsy and he would need to be cleared to come off this before any procedure took place.  Given this, he would like to repeat a PSA before any intervention which I think is appropriate.  I will see him back at the end of January with a repeat PSA    I discussed elevated PSA with him today. We discussed that PSA is a protein measured in the bloodstream that comes exclusively from the prostate gland I mentioned to him that all men with a prostate gland will have a certain PSA level. We discussed that this number can be compared to all men and age-specific PSA as well as PSA velocity. We discussed that Prostate Cancer is a possible cause of PSA elevaton, but benign etiologies such as infection, enlargement, aging, and inflammation should also be considered. We discussed that some patients with a normal PSA may also have prostate cancer. The necessity of digital rectal examination is also discussed. The role of free to total PSA Ratio is explained. The risks and possible benefits of transrectal ultrasound with biopsy of the prostate gland is also discussed. He has opted to just repeat the PSA in a few months, understanding the risk of undiagnosed prostate cancer if present. He voiced no additional questions.

## 2019-01-18 DIAGNOSIS — R97.20 ELEVATED PROSTATE SPECIFIC ANTIGEN (PSA): ICD-10-CM

## 2019-01-18 LAB — PSA SERPL-MCNC: 3.7 NG/ML (ref 0–4)

## 2019-01-24 ENCOUNTER — OFFICE VISIT (OUTPATIENT)
Dept: UROLOGY | Facility: CLINIC | Age: 69
End: 2019-01-24

## 2019-01-24 VITALS
TEMPERATURE: 99.1 F | BODY MASS INDEX: 26.51 KG/M2 | HEIGHT: 73 IN | SYSTOLIC BLOOD PRESSURE: 136 MMHG | WEIGHT: 200 LBS | DIASTOLIC BLOOD PRESSURE: 86 MMHG

## 2019-01-24 DIAGNOSIS — R97.20 ELEVATED PROSTATE SPECIFIC ANTIGEN (PSA): Primary | ICD-10-CM

## 2019-01-24 DIAGNOSIS — N40.0 BENIGN PROSTATIC HYPERPLASIA WITHOUT LOWER URINARY TRACT SYMPTOMS: ICD-10-CM

## 2019-01-24 LAB
BILIRUB BLD-MCNC: NEGATIVE MG/DL
CLARITY, POC: CLEAR
COLOR UR: YELLOW
GLUCOSE UR STRIP-MCNC: NEGATIVE MG/DL
KETONES UR QL: NEGATIVE
LEUKOCYTE EST, POC: NEGATIVE
NITRITE UR-MCNC: NEGATIVE MG/ML
PH UR: 6 [PH] (ref 5–8)
PROT UR STRIP-MCNC: NEGATIVE MG/DL
RBC # UR STRIP: NEGATIVE /UL
SP GR UR: 1.01 (ref 1–1.03)
UROBILINOGEN UR QL: NORMAL

## 2019-01-24 PROCEDURE — 99213 OFFICE O/P EST LOW 20 MIN: CPT | Performed by: UROLOGY

## 2019-01-24 PROCEDURE — 81003 URINALYSIS AUTO W/O SCOPE: CPT | Performed by: UROLOGY

## 2019-01-24 NOTE — PROGRESS NOTES
Mr. Christina is 68 y.o. male    Chief Complaint   Patient presents with   • Elevated PSA       History of Present Illness  Elevated PSA  Patient is here with an elevated PSA. He has no personal history of prostate cancer. His AUA Symptom Score is 11/35, manifested as obstructive symptoms including weak stream. He has no prior genitourinary history of previous  surgery.  Previous PSA values are :   Lab Results   Component Value Date    PSA 3.700 01/18/2019      He reports weak stream. He denies straining. Patient states symptoms are of moderate severity. Onset of symptoms was several years ago and was gradual in onset.    The following portions of the patient's history were reviewed and updated as appropriate: allergies, current medications, past family history, past medical history, past social history, past surgical history and problem list.    Review of Systems   Constitutional: Negative for chills and fever.   Gastrointestinal: Negative for abdominal pain, anal bleeding and blood in stool.   Genitourinary: Positive for frequency and urgency. Negative for dysuria and hematuria.       I have reviewed the review of systems      Current Outpatient Medications:   •  digoxin (LANOXIN) 250 MCG tablet, TAKE ONE TABLET BY MOUTH ONCE DAILY, Disp: , Rfl:   •  lisinopril (PRINIVIL,ZESTRIL) 5 MG tablet, Take 5 mg by mouth., Disp: , Rfl:   •  pantoprazole (PROTONIX) 40 MG EC tablet, Take 40 mg by mouth., Disp: , Rfl:   •  rivaroxaban (XARELTO) 20 MG tablet, TAKE ONE TABLET BY MOUTH ONCE DAILY WITH BREAKFAST, Disp: , Rfl:   •  tamsulosin (FLOMAX) 0.4 MG capsule 24 hr capsule, Take 0.4 mg by mouth., Disp: , Rfl:   •  therapeutic multivitamin-minerals (THERAGRAN-M) tablet, Take  by mouth., Disp: , Rfl:     Past Medical History:   Diagnosis Date   • Allergic rhinitis    • Arthritis    • Garza's esophagus    • GERD (gastroesophageal reflux disease)    • Hx of colonic polyps    • Hyperlipidemia        Past Surgical History:  "  Procedure Laterality Date   • COLONOSCOPY W/ POLYPECTOMY  01/19/2015    Tubular adenomatous polyp ascending colon, Polyp at 40 cm insufficient tissue for diagnosis, Diverticulosis repeat exam in 5 years   • ENDOSCOPY  03/23/2015    Intestinal Metaplasia mild to moderate chronic inflammation, HH repeat exam in 3 years   • UPPER GASTROINTESTINAL ENDOSCOPY  12/13/2010    [facility]       Social History     Socioeconomic History   • Marital status:      Spouse name: Not on file   • Number of children: Not on file   • Years of education: Not on file   • Highest education level: Not on file   Tobacco Use   • Smoking status: Never Smoker   • Smokeless tobacco: Never Used   Substance and Sexual Activity   • Alcohol use: Yes     Comment: Moderate   • Drug use: No       Family History   Problem Relation Age of Onset   • Colon polyps Brother    • Ulcerative colitis Brother    • Colon cancer Neg Hx        Objective    /86   Temp 99.1 °F (37.3 °C)   Ht 185.4 cm (73\")   Wt 90.7 kg (200 lb)   BMI 26.39 kg/m²     Physical Exam    Orders Only on 01/18/2019   Component Date Value Ref Range Status   • PSA 01/18/2019 3.700  0.000 - 4.000 ng/mL Final       Results for orders placed or performed in visit on 01/24/19   POC Urinalysis Dipstick, Multipro   Result Value Ref Range    Color Yellow Yellow, Straw, Dark Yellow, Deann    Clarity, UA Clear Clear    Glucose, UA Negative Negative, 1000 mg/dL (3+) mg/dL    Bilirubin Negative Negative    Ketones, UA Negative Negative    Specific Gravity  1.010 1.005 - 1.030    Blood, UA Negative Negative    pH, Urine 6.0 5.0 - 8.0    Protein, POC Negative Negative mg/dL    Urobilinogen, UA Normal Normal    Nitrite, UA Negative Negative    Leukocytes Negative Negative     Assessment and Plan    Bobby was seen today for elevated psa.    Diagnoses and all orders for this visit:    Elevated prostate specific antigen (PSA)  -     POC Urinalysis Dipstick, Multipro  -     PSA DIAGNOSTIC; " Future    Benign prostatic hyperplasia without lower urinary tract symptoms    Elevated PSA and BPH, 2 chronic stable conditions.  With regards to his BPH, he is overall happy with his symptoms and he would like to continue on the Flomax.    He does have an elevated PSA in the past, his repeat PSA today is 3.7.  I discussed with him that this does not necessarily mean that he does not have prostate cancer, but he does have a PSA below 4 and a exam without nodules.  Because he is on blood thinners I think the best course of action would be to continue to monitor, but I did offer him a biopsy.  He would like to continue to monitor which I think is most appropriate.  I will see him back in 6 months with a repeat PSA.

## 2019-05-01 DIAGNOSIS — I48.91 ATRIAL FIBRILLATION, UNSPECIFIED TYPE (HCC): ICD-10-CM

## 2019-05-01 RX ORDER — RIVAROXABAN 20 MG/1
TABLET, FILM COATED ORAL
Qty: 90 TABLET | Refills: 3 | Status: SHIPPED | OUTPATIENT
Start: 2019-05-01 | End: 2020-05-04

## 2019-05-10 DIAGNOSIS — I48.91 ATRIAL FIBRILLATION, UNSPECIFIED TYPE (HCC): ICD-10-CM

## 2019-05-10 RX ORDER — DIGOXIN 250 MCG
TABLET ORAL
Qty: 90 TABLET | Refills: 3 | Status: SHIPPED
Start: 2019-05-10 | End: 2020-03-16 | Stop reason: ALTCHOICE

## 2019-06-03 ENCOUNTER — OFFICE VISIT (OUTPATIENT)
Dept: CARDIOLOGY | Age: 69
End: 2019-06-03
Payer: MEDICARE

## 2019-06-03 VITALS
WEIGHT: 191 LBS | BODY MASS INDEX: 24.51 KG/M2 | DIASTOLIC BLOOD PRESSURE: 84 MMHG | HEART RATE: 78 BPM | SYSTOLIC BLOOD PRESSURE: 120 MMHG | HEIGHT: 74 IN

## 2019-06-03 DIAGNOSIS — E78.2 MIXED HYPERLIPIDEMIA: ICD-10-CM

## 2019-06-03 DIAGNOSIS — I48.0 PAROXYSMAL ATRIAL FIBRILLATION (HCC): Primary | ICD-10-CM

## 2019-06-03 DIAGNOSIS — Z79.01 CHRONIC ANTICOAGULATION: ICD-10-CM

## 2019-06-03 DIAGNOSIS — I10 ESSENTIAL HYPERTENSION: ICD-10-CM

## 2019-06-03 PROCEDURE — 1123F ACP DISCUSS/DSCN MKR DOCD: CPT | Performed by: NURSE PRACTITIONER

## 2019-06-03 PROCEDURE — 4040F PNEUMOC VAC/ADMIN/RCVD: CPT | Performed by: NURSE PRACTITIONER

## 2019-06-03 PROCEDURE — 93000 ELECTROCARDIOGRAM COMPLETE: CPT | Performed by: NURSE PRACTITIONER

## 2019-06-03 PROCEDURE — G8420 CALC BMI NORM PARAMETERS: HCPCS | Performed by: NURSE PRACTITIONER

## 2019-06-03 PROCEDURE — 1036F TOBACCO NON-USER: CPT | Performed by: NURSE PRACTITIONER

## 2019-06-03 PROCEDURE — 3017F COLORECTAL CA SCREEN DOC REV: CPT | Performed by: NURSE PRACTITIONER

## 2019-06-03 PROCEDURE — 99213 OFFICE O/P EST LOW 20 MIN: CPT | Performed by: NURSE PRACTITIONER

## 2019-06-03 PROCEDURE — G8427 DOCREV CUR MEDS BY ELIG CLIN: HCPCS | Performed by: NURSE PRACTITIONER

## 2019-06-03 NOTE — PROGRESS NOTES
[facility], [place]. [address] [address], [address] [facility]  [phone] office  [phone] fax      OFFICE VISIT:  6/3/2019    Reginald Ramos - : 1950    Reason For Visit:  Macrina Ferrari is a 77 y.o. male who is here for 6 Month Follow-Up (no cardiac symptoms) and Atrial Fibrillation  Patient followed for:  Paroxysmal atrial fibrillation [facility])    Essential hypertension    Chronic anticoagulation    Mixed hyperlipidemia      The patient presents today for cardiology follow up. Overall, the patient is doing well from a cardiac standpoint without symptoms to suggest myocardial ischemia or recurrent AF. BP is well controlled on current regimen. The patient's PCP monitors cholesterol. No bleeding issues on Xarelto. Subjective  Macrina Ferrari denies exertional chest pain, shortness of breath, orthopnea, paroxysmal nocturnal dyspnea, syncope, presyncope, sustained arrythmia, edema and fatigue. The patient denies numbness or weakness to suggest cerebrovascular accident or transient ischemic attack. Reginald Ramos has the following history as [facility] in [facility]:    Patient Active Problem List   Diagnosis Code    A-fib ([facility] I48.91    HTN (hypertension) I10    Hyperlipidemia E78.5    Chronic anticoagulation Z79.01     Past Medical History:   Diagnosis Date    [facility]     [facility] new onset    Allergic rhinitis     Reyes esophagus     GERD (gastroesophageal reflux disease)     HTN (hypertension)     Hyperlipidemia      Past Surgical History:   Procedure Laterality Date    APPENDECTOMY      COLONOSCOPY      CYST REMOVAL      FOOT SURGERY Left     TESTICLE SURGERY       Family History   Problem Relation Age of Onset    Heart Disease Paternal Grandmother     Heart Disease Father      Social History     Tobacco Use    Smoking status: Never Smoker    Smokeless tobacco: Never Used   Substance Use Topics    Alcohol use:  No Alcohol/week: 0.0 oz      Current Outpatient Medications   Medication Sig Dispense Refill    digoxin (LANOXIN) 250 MCG tablet TAKE 1 TABLET BY MOUTH ONCE DAILY 90 tablet 3    XARELTO 20 MG TABS tablet TAKE 1 TABLET BY MOUTH ONCE DAILY WITH BREAKFAST 90 tablet 3    lisinopril (PRINIVIL;ZESTRIL) 5 MG tablet Take 1 tablet by mouth daily 90 tablet 3    tamsulosin (FLOMAX) 0.4 MG capsule Take 0.4 mg by mouth daily      pantoprazole (PROTONIX) 40 MG tablet Take 40 mg by mouth daily      Multiple Vitamins-Minerals (THERAPEUTIC MULTIVITAMIN-MINERALS) tablet Take 1 tablet by mouth daily       No current facility-administered medications for this visit. Allergies: Statins    Review of Systems  Constitutional - no appetite change, or unexpected weight change. No fever, chills or diaphoresis. No significant change in activity level or new onset of fatigue. HEENT - no significant rhinorrhea or epistaxis. No tinnitus or significant hearing loss. Eyes - no sudden vision change or amaurosis. No corneal arcus, xantholasma, subconjunctival hemorrhage or discharge. Respiratory - no significant wheezing, stridor, apnea or cough. No dyspnea on exertion or shortness of air. Cardiovascular - no exertional chest pain to suggest myocardial ischemia. No orthopnea or PND. No sensation of sustained arrythmia. No occurrence of slow heart rate. No palpitations. No claudication. No leg edema. Gastrointestinal - no abdominal swelling or pain. No blood in stool. No severe constipation, diarrhea, nausea, or vomiting. Genitourinary - no dysuria, frequency, or urgency. No flank pain or hematuria. Musculoskeletal - no back pain or myalgia. No problems with gait. Extremities - no clubbing, cyanosis or edema. Skin - no color change or rash. No pallor. No new surgical incision. Neurologic - no speech difficulty, facial asymmetry or lateralizing weakness. No seizures, presyncope or syncope.   No significant dizziness. Hematologic - no easy bruising or excessive bleeding. Psychiatric - no severe anxiety or insomnia. No confusion. All other review of systems are negative. Objective  Vital Signs - /84   Pulse 78   Ht 6' 2\" (1.88 m)   Wt 191 lb (86.6 kg)   BMI 24.52 kg/m²   General - Perfecto Hall is alert, cooperative, and pleasant. Well groomed. No acute distress. Body habitus - Body mass index is 24.52 kg/m². HEENT - Head is normocephalic. No circumoral cyanosis. Dentition is normal.  EYES -   Lids normal without ptosis. No discharge, edema or subconjunctival hemorrhage. Neck - Symmetrical without apparent mass or lymphadenopathy. Respiratory - Normal respiratory effort without use of accessory muscles. Ausculatation reveals vesicular breath sounds without crackles, wheezes, rub or rhonchi. Cardiovascular - No jugular venous distention. Auscultation reveals regular rate and rhythm. No audible clicks, gallop or rub. No murmur. No lower extremity varicosities. No carotid bruits. Abdominal -  No visible distention, mass or pulsations. Extremities - No clubbing or cyanosis. No statis dermatitis or ulcers. No edema. Musculoskeletal -   No Osler's nodes. No kyphosis or scoliosis. Gait is even and regular without limp or shuffle. Ambulates without assistance. Skin -  Warm and dry; no rash or pallor. No new surgical wound. Neurological - No focal neurological deficits. Thought processes coherent. No apparent tremor. Oriented to person, place and time. Psychiatric -  Appropriate affect and mood. Assessment:     Diagnosis Orders   1. Paroxysmal atrial fibrillation (HCC)  EKG 12 lead   2. Essential hypertension     3. Chronic anticoagulation     4. Mixed hyperlipidemia       EKG reviewed:  NSR 78 bpm; QTc .389; no acute ischemic changes or ectopy. Stable CV status without symptoms of overt heart failure, arrhythmia or angina. No recurrent AF. BP well controlled.   PCP follows lipids. Patient is compliant with medication regimen. BP Readings from Last 3 Encounters:   06/03/19 120/84   10/29/18 138/80   04/26/18 138/74    Pulse Readings from Last 3 Encounters:   06/03/19 78   10/29/18 65   04/26/18 68        Wt Readings from Last 3 Encounters:   06/03/19 191 lb (86.6 kg)   10/29/18 190 lb (86.2 kg)   04/26/18 190 lb (86.2 kg)     Plan  Previous cardiac history and records reviewed. Continue current medications as prescribed. Continue to follow up with primary care provider for non cardiac medical problems. Call the office with any problems, questions or concerns at [phone]. Follow up as scheduled with your cardiologist.  The following educational material has been included in this after visit summary for your review: heart health. Xarelto. Additional instructions:  Coronary artery disease risk factors you can control:  Smoking, high blood pressure, high cholesterol, diabetes, being overweight, lack of exercise and stress. Continue heart healthy diet. Take medications as directed. Exercise as tolerated. Strive for 15 minutes of exercise most days of the week. If asked to keep a blood pressure log, do so for 2 weeks. Call the office to report readings at [phone]. Blood pressure goal is 140/90 or less. If you are a diabetic, the goal is 130/80 or less. If you are taking cholesterol lowering medications, it is recommended that lab work be checked annually. Always keep a current medication list.  Bring your medications to every office visit.       Eleanora Denver, APRN

## 2019-06-03 NOTE — PATIENT INSTRUCTIONS
uses for energy. Over time, high levels of blood sugar can damage your heart, kidneys, eyes and nerves. 4) Get active - living an active life is one of the most rewarding gifts you can give yourself and those you love. Simply put, daily physical activity increases your length and quality of life. 5)  Eat better - A healthy diet is one of your best weapons for fighting cardiovascular disease. When you eat a heart healthy diet, you improve your chances for feeling good and staying healthy for life. 6)  Lose weight - when you shed extra fat an unnecessary pounds, you reduce the burden on your hear, lungs, blood vessels and skeleton. You give yourself the gift of active living, you lower your blood pressure and help yourself feel better. 7) Stop smoking - cigarette smokers have a higher risk of developing cardiovascular disease. If  You smoke, quitting is the best thing you can do for your health. Check American Heart Association on line for more information on Life's Simple 7 and tips for healthy living.       Patient Education     rivaroxaban  Pronunciation:  WILLIAM a PORFIRIO a ban  Brand:  Xarelto  What is the most important information I should know about rivaroxaban? Do not stop taking rivaroxaban without first talking to your doctor. Stopping suddenly can increase your risk of blood clot or stroke. Rivaroxaban can cause you to bleed more easily. Call your doctor at once if you have signs of bleeding such as: headaches, feeling very weak or dizzy, bleeding gums, nosebleeds, heavy menstrual periods or abnormal vaginal bleeding, blood in your urine, bloody or tarry stools, coughing up blood or vomit that looks like coffee grounds. Many other drugs can increase your risk of bleeding when used with rivaroxaban. Tell your doctor about all medicines you have recently used. Rivaroxaban can cause a very serious blood clot around your spinal cord if you undergo a spinal tap or receive spinal anesthesia (epidural). Tell any doctor who treats you that you are taking rivaroxaban. What is rivaroxaban? Rivaroxaban blocks the activity of certain clotting substances in the blood. Rivaroxaban is used to prevent or treat a type of blood clot called deep vein thrombosis (DVT), which can lead to blood clots in the lungs (pulmonary embolism). A DVT can occur after certain types of surgery. Rivaroxaban is sometimes used to lower your risk of a DVT or PE coming back after you have received treatment for blood clots for at least 6 months. Rivaroxaban is also used in people with atrial fibrillation (a heart rhythm disorder) to lower the risk of stroke caused by a blood clot. Rivaroxaban may also be used for purposes not listed in this medication guide. What should I discuss with my healthcare provider before taking rivaroxaban? You should not use rivaroxaban if you are allergic to it, or if you have active or uncontrolled bleeding. Rivaroxaban can cause a very serious blood clot around your spinal cord if you undergo a spinal tap or receive spinal anesthesia (epidural). This type of blood clot could cause long-term paralysis, and may be more likely to occur if:   · you have a genetic spinal defect;  · you have a spinal catheter in place;  · you have a history of spinal surgery or repeated spinal taps;  · you have recently had a spinal tap or epidural anesthesia;  · you are taking an NSAID--Advil, Aleve, Motrin, and others; or  · you are using other medicines to treat or prevent blood clots. Rivaroxaban may cause you to bleed more easily, especially if you have:  · a bleeding disorder that is inherited or caused by disease;  · hemorrhagic stroke;  · uncontrolled high blood pressure;  · stomach or intestinal bleeding or ulcer; or  · if you take certain medicines such as aspirin, enoxaparin, heparin, warfarin (Coumadin, Madelin Santo), clopidogrel (Plavix), or certain antidepressants.   Tell your doctor if you have ever had:  · an artificial heart valve; or  · liver or kidney disease. Taking rivaroxaban during pregnancy may cause bleeding in the mother or the unborn baby. Tell your doctor if you are pregnant or plan to become pregnant. It may not be safe to breast-feed a baby while you are using this medicine. Ask your doctor about any risks. How should I take rivaroxaban? Follow all directions on your prescription label and read all medication guides or instruction sheets. Your doctor may occasionally change your dose. Use the medicine exactly as directed. The number of times you take rivaroxaban each day will depend on the reason you are using this medication. For some conditions, rivaroxaban should be taken with food. Whether you take the medicine with or without food may also depend on the tablet strength you take. Follow your doctor's dosing instructions very carefully. Tell your doctor if you have trouble swallowing a rivaroxaban tablet. Tell any doctor who treats you that you are using rivaroxaban. If you need surgery or dental work, tell the surgeon or dentist ahead of time that you are using this medication. If you need anesthesia for a medical procedure or surgery, you may need to stop using rivaroxaban for a short time. Do not change your dose or stop taking this medication without first talking to your doctor. Stopping suddenly can increase your risk of blood clot or stroke. Store at room temperature away from moisture and heat. What happens if I miss a dose? If you take rivaroxaban 1 time each day: Take the medicine as soon as you remember, and then go back to your regular schedule. Do not take two doses at one time. If you take rivaroxaban 2 times each day: Take the missed dose as soon as you remember. You may take 2 doses at the same time to make up a missed dose. Get your prescription refilled before you run out of medicine completely. What happens if I overdose?   Seek emergency medical attention or call the pharmacist can provide more information about rivaroxaban. Remember, keep this and all other medicines out of the reach of children, never share your medicines with others, and use this medication only for the indication prescribed. Every effort has been made to ensure that the information provided by [address] is accurate, up-to-date, and complete, but no guarantee is made to that effect. Drug information contained herein may be time sensitive. [facility] information has been compiled for use by healthcare practitioners and consumers in the [place] and therefore [facility] does not warrant that uses outside of the [place] are appropriate, unless specifically indicated otherwise. [facility]'s drug information does not endorse drugs, diagnose patients or recommend therapy. [facility]'s drug information is an informational resource designed to assist licensed healthcare practitioners in caring for their patients and/or to serve consumers viewing this service as a supplement to, and not a substitute for, the expertise, skill, knowledge and judgment of healthcare practitioners. The absence of a warning for a given drug or drug combination in no way should be construed to indicate that the drug or drug combination is safe, effective or appropriate for any given patient. [facility] does not assume any responsibility for any aspect of healthcare administered with the aid of information [facility] provides. The information contained herein is not intended to cover all possible uses, directions, precautions, warnings, drug interactions, allergic reactions, or adverse effects. If you have questions about the drugs you are taking, check with your doctor, nurse or pharmacist.  Copyright 9917-5754 [address]. Version: 5.01. Revision date: 2/26/2018. Care instructions adapted under license by [facility] ([facility]).  If you have questions about a medical condition or this instruction, always ask your healthcare professional. [address] any warranty or liability for your use of this information. Patient Education        A Healthy Heart: Care Instructions  Your Care Instructions    Heart disease occurs when a substance called plaque builds up in the vessels that supply oxygen-rich blood to your heart. This can narrow the blood vessels and reduce blood flow. A heart attack happens when blood flow is completely blocked. A high-fat diet, smoking, and other factors increase the risk of heart disease. Your doctor has found that you have a chance of having heart disease. You can do lots of things to keep your heart healthy. It may not be easy, but you can change your diet, exercise more, and quit smoking. These steps really work to lower your chance of heart disease. Follow-up care is a key part of your treatment and safety. Be sure to make and go to all appointments, and call your doctor if you are having problems. It's also a good idea to know your test results and keep a list of the medicines you take. How can you care for yourself at home? Diet    · Use less salt when you cook and eat. This helps lower your blood pressure. Taste food before salting. Add only a little salt when you think you need it. With time, your taste buds will adjust to less salt.     · Eat fewer snack items, fast foods, canned soups, and other high-salt, high-fat, processed foods.     · Read food labels and try to avoid saturated and trans fats. They increase your risk of heart disease by raising cholesterol levels.     · Limit the amount of solid fat-butter, margarine, and shortening-you eat. Use olive, peanut, or canola oil when you cook. Bake, broil, and steam foods instead of frying them.     · Eating fish can lower your risk for heart disease. Eat at least 2 servings of fish a week. Graham, mackerel, herring, sardines, and chunk light tuna are very good choices.  These fish contain omega-3 fatty acids.     · Eat a variety of fruit and vegetables every day. Dark green, deep orange, red, or yellow fruits and vegetables are especially good for you. Examples include spinach, carrots, peaches, and berries.     · Foods high in fiber can reduce your cholesterol and provide important vitamins and minerals. High-fiber foods include whole-grain cereals and breads, oatmeal, beans, brown rice, citrus fruits, and apples.     · Limit drinks and foods with added sugar. These include candy, desserts, and soda pop.    Lifestyle changes    · If your doctor recommends it, get more exercise. Walking is a good choice. Bit by bit, increase the amount you walk every day. Try for at least 30 minutes on most days of the week. You also may want to swim, bike, or do other activities.     · Do not smoke. If you need help quitting, talk to your doctor about stop-smoking programs and medicines. These can increase your chances of quitting for good. Quitting smoking may be the most important step you can take to protect your heart. It is never too late to quit. You will get health benefits right away.     · Limit alcohol to 2 drinks a day for men and 1 drink a day for women. Too much alcohol can cause health problems. Medicines    · Take your medicines exactly as prescribed. Call your doctor if you think you are having a problem with your medicine.     · If your doctor recommends aspirin, take the amount directed each day. Make sure you take aspirin and not another kind of pain reliever, such as acetaminophen (Tylenol). If you take ibuprofen (such as Advil or Motrin) for other problems, take aspirin at least 2 hours before taking ibuprofen. When should you call for help? Call 911 if you have symptoms of a heart attack.  These may include:    · Chest pain or pressure, or a strange feeling in the chest.     · Sweating.     · Shortness of breath.     · Pain, pressure, or a strange feeling in the back, neck, jaw, or upper belly or in one or both shoulders or arms.     · Lightheadedness or sudden weakness.     · A fast or irregular heartbeat.    After you call 911, the  may tell you to chew 1 adult-strength or 2 to 4 low-dose aspirin. Wait for an ambulance. Do not try to drive yourself.   Watch closely for changes in your health, and be sure to contact your doctor if you have any problems. Where can you learn more? Go to https://[employer]. org and sign in to your [employer] account. Enter H973 in the [employer] box to learn more about \"A Healthy Heart: Care Instructions. \"     If you do not have an account, please click on the \"Sign Up Now\" link. Current as of: July 22, 2018  Content Version: 12.0  © 9557-0930 Healthwise, Incorporated. Care instructions adapted under license by [facility] ([facility]). If you have questions about a medical condition or this instruction, always ask your healthcare professional. [place] [address] any warranty or liability for your use of this information.

## 2019-07-10 DIAGNOSIS — R97.20 ELEVATED PROSTATE SPECIFIC ANTIGEN (PSA): ICD-10-CM

## 2019-07-10 LAB — PSA SERPL-MCNC: 3.79 NG/ML (ref 0–4)

## 2019-07-15 NOTE — PROGRESS NOTES
Mr. Christina is 69 y.o. male    Chief Complaint   Patient presents with   • Benign Prostatic Hypertrophy   • Elevated PSA       Benign Prostatic Hypertrophy   This is a chronic problem. The current episode started more than 1 year ago. The problem is unchanged. Irritative symptoms do not include frequency or urgency. Pertinent negatives include no chills, dysuria or hematuria. AUA score is 0-7. Nothing aggravates the symptoms. Past treatments include tamsulosin. The treatment provided significant relief. He has been using treatment for 1 to 2 years.       The following portions of the patient's history were reviewed and updated as appropriate: allergies, current medications, past family history, past medical history, past social history, past surgical history and problem list.    Review of Systems   Constitutional: Negative for chills and fever.   Gastrointestinal: Negative for abdominal pain, anal bleeding and blood in stool.   Genitourinary: Negative for decreased urine volume, difficulty urinating, discharge, dysuria, enuresis, flank pain, frequency, genital sores, hematuria, penile pain, penile swelling, scrotal swelling, testicular pain and urgency.       I have reviewed the review of systems      Current Outpatient Medications:   •  digoxin (LANOXIN) 250 MCG tablet, TAKE ONE TABLET BY MOUTH ONCE DAILY, Disp: , Rfl:   •  lisinopril (PRINIVIL,ZESTRIL) 5 MG tablet, Take 5 mg by mouth., Disp: , Rfl:   •  pantoprazole (PROTONIX) 40 MG EC tablet, Take 40 mg by mouth., Disp: , Rfl:   •  rivaroxaban (XARELTO) 20 MG tablet, TAKE ONE TABLET BY MOUTH ONCE DAILY WITH BREAKFAST, Disp: , Rfl:   •  tamsulosin (FLOMAX) 0.4 MG capsule 24 hr capsule, Take 1 capsule by mouth Daily., Disp: 30 capsule, Rfl: 11  •  therapeutic multivitamin-minerals (THERAGRAN-M) tablet, Take  by mouth., Disp: , Rfl:     Past Medical History:   Diagnosis Date   • Allergic rhinitis    • Arthritis    • Garza's esophagus    • GERD (gastroesophageal  reflux disease)    • Hx of colonic polyps    • Hyperlipidemia        Past Surgical History:   Procedure Laterality Date   • COLONOSCOPY W/ POLYPECTOMY  01/19/2015    Tubular adenomatous polyp ascending colon, Polyp at 40 cm insufficient tissue for diagnosis, Diverticulosis repeat exam in 5 years   • ENDOSCOPY  03/23/2015    Intestinal Metaplasia mild to moderate chronic inflammation, HH repeat exam in 3 years   • UPPER GASTROINTESTINAL ENDOSCOPY  12/13/2010    [facility]       Social History     Socioeconomic History   • Marital status:      Spouse name: Not on file   • Number of children: Not on file   • Years of education: Not on file   • Highest education level: Not on file   Tobacco Use   • Smoking status: Never Smoker   • Smokeless tobacco: Never Used   Substance and Sexual Activity   • Alcohol use: Yes     Comment: Moderate   • Drug use: No   • Sexual activity: Defer       Family History   Problem Relation Age of Onset   • Colon polyps Brother    • Ulcerative colitis Brother    • Colon cancer Neg Hx        Objective    There were no vitals taken for this visit.    Physical Exam  40-50 g no nodules  Orders Only on 07/10/2019   Component Date Value Ref Range Status   • PSA 07/10/2019 3.790  0.000 - 4.000 ng/mL Final       Results for orders placed or performed in visit on 07/16/19   POC Urinalysis Dipstick, Multipro   Result Value Ref Range    Color Yellow Yellow, Straw, Dark Yellow, Deann    Clarity, UA Clear Clear    Glucose, UA Negative Negative, 1000 mg/dL (3+) mg/dL    Bilirubin Negative Negative    Ketones, UA Negative Negative    Specific Gravity  1.010 1.005 - 1.030    Blood, UA Negative Negative    pH, Urine 6.5 5.0 - 8.0    Protein, POC Negative Negative mg/dL    Urobilinogen, UA Normal Normal    Nitrite, UA Negative Negative    Leukocytes Trace (A) Negative     Patient's There is no height or weight on file to calculate BMI. BMI is above normal parameters. Recommendations include: educational  material.    Assessment and Plan    Bobby was seen today for benign prostatic hypertrophy and elevated psa.    Diagnoses and all orders for this visit:    Elevated prostate specific antigen (PSA)  -     POC Urinalysis Dipstick, Multipro  -     PSA DIAGNOSTIC; Future    Benign prostatic hyperplasia without lower urinary tract symptoms  -     POC Urinalysis Dipstick, Multipro  -     tamsulosin (FLOMAX) 0.4 MG capsule 24 hr capsule; Take 1 capsule by mouth Daily.    Elevated PSA BPH to chronic conditions.  He has had a history of an elevated PSA up to 5.3.  He chose to monitor this.  Since that time he has had multiple PSA which have been below 4.  PSA today is 3.79.  Unchanged from his last visit.  I explained to him again that this does not mean prostate cancer is absent, but with a exam without nodules and a PSA less than 4 the odds of having an aggressive disease is low.  I would suggest continue to monitor with a PSA in 1 year.    Patient does have BPH and is on Flomax.  UA symptom score 7 with a bother score of 2 meaning he is mostly satisfied.  He will continue on this medication.

## 2019-07-16 ENCOUNTER — OFFICE VISIT (OUTPATIENT)
Dept: UROLOGY | Facility: CLINIC | Age: 69
End: 2019-07-16

## 2019-07-16 DIAGNOSIS — R97.20 ELEVATED PROSTATE SPECIFIC ANTIGEN (PSA): Primary | ICD-10-CM

## 2019-07-16 DIAGNOSIS — N40.0 BENIGN PROSTATIC HYPERPLASIA WITHOUT LOWER URINARY TRACT SYMPTOMS: ICD-10-CM

## 2019-07-16 LAB
BILIRUB BLD-MCNC: NEGATIVE MG/DL
CLARITY, POC: CLEAR
COLOR UR: YELLOW
GLUCOSE UR STRIP-MCNC: NEGATIVE MG/DL
KETONES UR QL: NEGATIVE
LEUKOCYTE EST, POC: ABNORMAL
NITRITE UR-MCNC: NEGATIVE MG/ML
PH UR: 6.5 [PH] (ref 5–8)
PROT UR STRIP-MCNC: NEGATIVE MG/DL
RBC # UR STRIP: NEGATIVE /UL
SP GR UR: 1.01 (ref 1–1.03)
UROBILINOGEN UR QL: NORMAL

## 2019-07-16 PROCEDURE — 99213 OFFICE O/P EST LOW 20 MIN: CPT | Performed by: UROLOGY

## 2019-07-16 PROCEDURE — 81003 URINALYSIS AUTO W/O SCOPE: CPT | Performed by: UROLOGY

## 2019-07-16 RX ORDER — TAMSULOSIN HYDROCHLORIDE 0.4 MG/1
0.4 CAPSULE ORAL DAILY
Qty: 30 CAPSULE | Refills: 11 | Status: SHIPPED | OUTPATIENT
Start: 2019-07-16

## 2019-07-16 NOTE — PATIENT INSTRUCTIONS

## 2019-12-03 ENCOUNTER — OFFICE VISIT (OUTPATIENT)
Dept: CARDIOLOGY | Age: 69
End: 2019-12-03
Payer: MEDICARE

## 2019-12-03 VITALS
DIASTOLIC BLOOD PRESSURE: 88 MMHG | HEART RATE: 80 BPM | WEIGHT: 193 LBS | SYSTOLIC BLOOD PRESSURE: 138 MMHG | HEIGHT: 74 IN | BODY MASS INDEX: 24.77 KG/M2

## 2019-12-03 DIAGNOSIS — E78.2 MIXED HYPERLIPIDEMIA: ICD-10-CM

## 2019-12-03 DIAGNOSIS — I48.0 PAF (PAROXYSMAL ATRIAL FIBRILLATION) (HCC): Primary | ICD-10-CM

## 2019-12-03 DIAGNOSIS — I10 ESSENTIAL HYPERTENSION: ICD-10-CM

## 2019-12-03 DIAGNOSIS — Z79.01 CHRONIC ANTICOAGULATION: ICD-10-CM

## 2019-12-03 PROCEDURE — 3017F COLORECTAL CA SCREEN DOC REV: CPT | Performed by: NURSE PRACTITIONER

## 2019-12-03 PROCEDURE — G8484 FLU IMMUNIZE NO ADMIN: HCPCS | Performed by: NURSE PRACTITIONER

## 2019-12-03 PROCEDURE — G8427 DOCREV CUR MEDS BY ELIG CLIN: HCPCS | Performed by: NURSE PRACTITIONER

## 2019-12-03 PROCEDURE — 1123F ACP DISCUSS/DSCN MKR DOCD: CPT | Performed by: NURSE PRACTITIONER

## 2019-12-03 PROCEDURE — G8417 CALC BMI ABV UP PARAM F/U: HCPCS | Performed by: NURSE PRACTITIONER

## 2019-12-03 PROCEDURE — 1036F TOBACCO NON-USER: CPT | Performed by: NURSE PRACTITIONER

## 2019-12-03 PROCEDURE — 99213 OFFICE O/P EST LOW 20 MIN: CPT | Performed by: NURSE PRACTITIONER

## 2019-12-03 PROCEDURE — 4040F PNEUMOC VAC/ADMIN/RCVD: CPT | Performed by: NURSE PRACTITIONER

## 2019-12-03 RX ORDER — CETIRIZINE HYDROCHLORIDE 10 MG/1
10 TABLET ORAL DAILY
COMMUNITY

## 2020-03-13 ENCOUNTER — TELEPHONE (OUTPATIENT)
Dept: CARDIOLOGY | Age: 70
End: 2020-03-13

## 2020-03-16 NOTE — TELEPHONE ENCOUNTER
Called and spoke with patient, advised to stop digoxin and start Atenolol 25 mg daily. Patient verbally understood.

## 2020-03-17 RX ORDER — ATENOLOL 25 MG/1
25 TABLET ORAL DAILY
Qty: 30 TABLET | Refills: 5 | Status: SHIPPED | OUTPATIENT
Start: 2020-03-17 | End: 2021-07-01 | Stop reason: ALTCHOICE

## 2020-04-06 ENCOUNTER — TELEPHONE (OUTPATIENT)
Dept: CARDIOLOGY | Age: 70
End: 2020-04-06

## 2020-04-06 NOTE — TELEPHONE ENCOUNTER
Called and spoke with patient, pt has no idea what his blood pressure has been running. He stated he hasn't had his bp checked since last fall. Patient states he's never had any issues with his bp.

## 2020-04-06 NOTE — TELEPHONE ENCOUNTER
Pt called stating he can't take the atenolol. It makes him sick to his stomach, worse heart burn he has ever had, diarrhea, can't sleep due to heart burn.  Pt wants to return to digoxin 0.25 mg

## 2020-04-20 RX ORDER — DIGOXIN 250 MCG
250 TABLET ORAL DAILY
Qty: 30 TABLET | Refills: 3 | Status: SHIPPED | OUTPATIENT
Start: 2020-04-20 | End: 2020-08-31

## 2020-05-04 RX ORDER — RIVAROXABAN 20 MG/1
TABLET, FILM COATED ORAL
Qty: 90 TABLET | Refills: 3 | Status: SHIPPED | OUTPATIENT
Start: 2020-05-04 | End: 2020-12-07 | Stop reason: SDUPTHER

## 2020-06-05 ENCOUNTER — OFFICE VISIT (OUTPATIENT)
Dept: CARDIOLOGY | Age: 70
End: 2020-06-05
Payer: MEDICARE

## 2020-06-05 VITALS
HEIGHT: 74 IN | WEIGHT: 196 LBS | BODY MASS INDEX: 25.15 KG/M2 | DIASTOLIC BLOOD PRESSURE: 70 MMHG | SYSTOLIC BLOOD PRESSURE: 140 MMHG | HEART RATE: 80 BPM

## 2020-06-05 PROCEDURE — 3017F COLORECTAL CA SCREEN DOC REV: CPT | Performed by: INTERNAL MEDICINE

## 2020-06-05 PROCEDURE — G8417 CALC BMI ABV UP PARAM F/U: HCPCS | Performed by: INTERNAL MEDICINE

## 2020-06-05 PROCEDURE — G8427 DOCREV CUR MEDS BY ELIG CLIN: HCPCS | Performed by: INTERNAL MEDICINE

## 2020-06-05 PROCEDURE — 93000 ELECTROCARDIOGRAM COMPLETE: CPT | Performed by: INTERNAL MEDICINE

## 2020-06-05 PROCEDURE — 4040F PNEUMOC VAC/ADMIN/RCVD: CPT | Performed by: INTERNAL MEDICINE

## 2020-06-05 PROCEDURE — 1036F TOBACCO NON-USER: CPT | Performed by: INTERNAL MEDICINE

## 2020-06-05 PROCEDURE — 99213 OFFICE O/P EST LOW 20 MIN: CPT | Performed by: INTERNAL MEDICINE

## 2020-06-05 PROCEDURE — 1123F ACP DISCUSS/DSCN MKR DOCD: CPT | Performed by: INTERNAL MEDICINE

## 2020-06-05 NOTE — PATIENT INSTRUCTIONS
Have a fasting lipid panel prior to your next appointment. You must be fasting at least 8 hours prior to lab draw. The lab is on the first floor of the [facility].

## 2020-06-05 NOTE — PROGRESS NOTES
68-year-old gentleman with history of combined dyslipidemia, labile hypertension, and paroxysmal atrial fibrillation/anticoagulation returns for routine follow-up. Discovered with new onset atrial fib in 2015 he relates no symptoms of recurrent dysrhythmia at this time or any symptoms that would suggest LV dysfunction or coronary disease. He is very active with no change in his exercise tolerance and no excessive dyspnea. 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  He is already had COVID-19 with some residual loss of smell and taste to this point in time. On exam he carries 196 pounds in a 6 foot 2 inch frame. Pressure is 140/70 pulse of 80. Bright very pleasant middle-aged gentleman with a normal male balding pattern. EOMs full, sclerae and conjunctiva normal. PERRLA. No oral cyanosis and dentition normal. Trachea midline with no neck masses. Assessment of internal jugular veins reveals no elevation of central venous pressure at 45 degrees. Carotid pulses normal without delay or bruit. Thyroid normal to palpation. Chest exam reveals normal respiratory effort, no abnormal breath sounds and normal expiratory phase. No skin lesions seen. PMI normal. S1, S2 normal without murmur or matthew or click. Normal bowel sounds without palpable mass or bruit. No clubbing or acrocyanosis. No significant lower extremity edema or signs of venous insufficiency. General motor strength appears to be within normal limits. Normal range of motion with normal gait. Alert, oriented x 3, memory and cognition normal as reflected by history and conversation. EKG reveals a sinus rhythm without abnormalities. Assessment/plan:  1. Dyslipidemia -will recheck lipid profile. Obviously statin therapy is not an option should he demonstrate significant elevation of his lipids.   2.  Paroxysmal atrial fibrillation -anticoagulant coverage in place with no symptoms of recurrent dysrhythmia  3. COVID-19 -he has already had it  4. Labile hypertension -whitecoat by history. Medical records reviewed prior to today's clinic visit including visually reviewing recent diagnostic studies such as ECHOs and angiograms. More than 15 minutes spent face-to-face with patient in evaluating, and carefully explaining problems and the planned approach and the reasons behind the decisions.

## 2020-07-16 ENCOUNTER — RESULTS ENCOUNTER (OUTPATIENT)
Dept: UROLOGY | Facility: CLINIC | Age: 70
End: 2020-07-16

## 2020-07-16 DIAGNOSIS — R97.20 ELEVATED PROSTATE SPECIFIC ANTIGEN (PSA): ICD-10-CM

## 2020-08-31 RX ORDER — DIGOXIN 250 MCG
TABLET ORAL
Qty: 30 TABLET | Refills: 5 | Status: SHIPPED | OUTPATIENT
Start: 2020-08-31

## 2020-12-03 DIAGNOSIS — E78.2 MIXED HYPERLIPIDEMIA: ICD-10-CM

## 2020-12-03 LAB
ALT SERPL-CCNC: 27 U/L (ref 5–41)
AST SERPL-CCNC: 24 U/L (ref 5–40)
CHOLESTEROL, TOTAL: 232 MG/DL (ref 160–199)
HDLC SERPL-MCNC: 56 MG/DL (ref 55–121)
LDL CHOLESTEROL CALCULATED: 159 MG/DL
TRIGL SERPL-MCNC: 85 MG/DL (ref 0–149)

## 2020-12-04 RX ORDER — EVOLOCUMAB 140 MG/ML
1 INJECTION, SOLUTION SUBCUTANEOUS
Qty: 2 PEN | Refills: 11 | Status: SHIPPED | OUTPATIENT
Start: 2020-12-04 | End: 2020-12-08

## 2020-12-07 ENCOUNTER — OFFICE VISIT (OUTPATIENT)
Dept: CARDIOLOGY | Age: 70
End: 2020-12-07
Payer: MEDICARE

## 2020-12-07 VITALS
OXYGEN SATURATION: 99 % | DIASTOLIC BLOOD PRESSURE: 80 MMHG | HEIGHT: 74 IN | SYSTOLIC BLOOD PRESSURE: 132 MMHG | BODY MASS INDEX: 25.41 KG/M2 | HEART RATE: 70 BPM | WEIGHT: 198 LBS

## 2020-12-07 PROCEDURE — G8417 CALC BMI ABV UP PARAM F/U: HCPCS | Performed by: NURSE PRACTITIONER

## 2020-12-07 PROCEDURE — G8427 DOCREV CUR MEDS BY ELIG CLIN: HCPCS | Performed by: NURSE PRACTITIONER

## 2020-12-07 PROCEDURE — 1123F ACP DISCUSS/DSCN MKR DOCD: CPT | Performed by: NURSE PRACTITIONER

## 2020-12-07 PROCEDURE — 4040F PNEUMOC VAC/ADMIN/RCVD: CPT | Performed by: NURSE PRACTITIONER

## 2020-12-07 PROCEDURE — G8484 FLU IMMUNIZE NO ADMIN: HCPCS | Performed by: NURSE PRACTITIONER

## 2020-12-07 PROCEDURE — 3017F COLORECTAL CA SCREEN DOC REV: CPT | Performed by: NURSE PRACTITIONER

## 2020-12-07 PROCEDURE — 1036F TOBACCO NON-USER: CPT | Performed by: NURSE PRACTITIONER

## 2020-12-07 PROCEDURE — 99213 OFFICE O/P EST LOW 20 MIN: CPT | Performed by: NURSE PRACTITIONER

## 2020-12-07 NOTE — PROGRESS NOTES
 Heart Disease Father      Social History     Tobacco Use    Smoking status: Never Smoker    Smokeless tobacco: Never Used   Substance Use Topics    Alcohol use: No     Alcohol/week: 0.0 standard drinks      Current Outpatient Medications   Medication Sig Dispense Refill    digoxin (LANOXIN) 250 MCG tablet TAKE 1 TABLET BY MOUTH ONCE DAILY. STOP ATENOLOL. 30 tablet 5    XARELTO 20 MG TABS tablet Take 1 tablet by mouth once daily with breakfast 90 tablet 3    atenolol (TENORMIN) 25 MG tablet Take 1 tablet by mouth daily 30 tablet 5    cetirizine (ZYRTEC) 10 MG tablet Take 10 mg by mouth daily      tamsulosin (FLOMAX) 0.4 MG capsule Take 0.4 mg by mouth daily      pantoprazole (PROTONIX) 40 MG tablet Take 40 mg by mouth daily      Multiple Vitamins-Minerals (THERAPEUTIC MULTIVITAMIN-MINERALS) tablet Take 1 tablet by mouth daily      Evolocumab (REPATHA SURECLICK) 568 MG/ML SOAJ Inject 1 mL into the skin every 14 days (Patient not taking: Reported on 12/7/2020) 2 pen 11     No current facility-administered medications for this visit. Allergies: Statins    Review of Systems  Constitutional - no appetite change, or unexpected weight change. No fever, chills or diaphoresis. No significant change in activity level or new onset of fatigue. HEENT - no significant rhinorrhea or epistaxis. No tinnitus or significant hearing loss. Eyes - no sudden vision change or amaurosis. No corneal arcus, xantholasma, subconjunctival hemorrhage or discharge. Respiratory - no significant wheezing, stridor, apnea or cough. No dyspnea on exertion or shortness of air. Cardiovascular - no exertional chest pain to suggest myocardial ischemia. No orthopnea or PND. No sensation of sustained arrythmia. No occurrence of slow heart rate. No palpitations. No claudication. Gastrointestinal - no abdominal swelling or pain. No blood in stool. No severe constipation, diarrhea, nausea, or vomiting.    Genitourinary - no dysuria, frequency, or urgency. No flank pain or hematuria. Musculoskeletal - no back pain or myalgia. No problems with gait. Extremities - no clubbing, cyanosis or extremity edema. Skin - no color change or rash. No pallor. No new surgical incision. Neurologic - no speech difficulty, facial asymmetry or lateralizing weakness. No seizures, presyncope or syncope. No significant dizziness. Hematologic - no easy bruising or excessive bleeding. Psychiatric - no severe anxiety or insomnia. No confusion. All other review of systems are negative. Objective  Vital Signs - /80   Pulse 70   Ht 6' 2\" (1.88 m)   Wt 198 lb (89.8 kg)   SpO2 99%   BMI 25.42 kg/m²   General - Mehul [place] is alert, cooperative, and pleasant. Well groomed. No acute distress. Body habitus - Body mass index is 25.42 kg/m². HEENT - Head is normocephalic. No circumoral cyanosis. Dentition is normal.  EYES -   Lids normal without ptosis. No discharge, edema or subconjunctival hemorrhage. Neck - Symmetrical without apparent mass or lymphadenopathy. Respiratory - Normal respiratory effort without use of accessory muscles. Ausculatation reveals vesicular breath sounds without crackles, wheezes, rub or rhonchi. Cardiovascular - No jugular venous distention. Auscultation reveals regular rate and rhythm. No audible clicks, gallop or rub. No murmur. No lower extremity varicosities. No carotid bruits. Abdominal -  No visible distention, mass or pulsations. Extremities - No clubbing or cyanosis. No statis dermatitis or ulcers. No edema. Musculoskeletal -   No Osler's nodes. No kyphosis or scoliosis. Gait is even and regular without limp or shuffle. Ambulates without assistance. Skin -  Warm and dry; no rash or pallor. No new surgical wound. Neurological - No focal neurological deficits. Thought processes coherent. No apparent tremor. Oriented to person, place and time.     Psychiatric -  Appropriate affect and mood. Assessment:     Diagnosis Orders   1. PAF (paroxysmal atrial fibrillation) ([facility] [address]     2. Essential hypertension     3. Chronic anticoagulation     4. Mixed hyperlipidemia     [id number] Score: 2  Disclaimer: Risk Score calculation is dependent on accuracy of patient problem list and past encounter diagnosis. Data reviewed:  Lab Results   Component Value Date    CHOL 232 (H) 12/03/2020     Lab Results   Component Value Date    TRIG 85 12/03/2020     Lab Results   Component Value Date    HDL 56 12/03/2020     Lab Results   Component Value Date    LDLCALC 159 12/03/2020       Lab Results   Component Value Date    ALT 27 12/03/2020     Stable CV status without overt heart failure, sensed arrhythmia or angina. PAF - no recurrent AF. Continues on Xarelto with no bleeding issues. HTN -normotensive on current regimen. Hyperlipidemia - . Patient is not taking Repatha. Hx statin intolerance. Patient is compliant with medication regimen. BP Readings from Last 3 Encounters:   12/07/20 132/80   06/05/20 (!) 140/70   12/03/19 138/88    Pulse Readings from Last 3 Encounters:   12/07/20 70   06/05/20 80   12/03/19 80        Wt Readings from Last 3 Encounters:   12/07/20 198 lb (89.8 kg)   06/05/20 196 lb (88.9 kg)   12/03/19 193 lb (87.5 kg)     Plan  Previous cardiac history and records reviewed. Continue current medical management. Continue other current medications as directed. Continue to follow up with primary care provider for non cardiac medical problems. Call the office with any problems, questions or concerns at [phone]. Cardiology follow up: Dr. Collette Rodríguez 6 months. Educational included in patient instructions. Heart health.      Mehnaz Palacio, APRN

## 2020-12-21 RX ORDER — ALIROCUMAB 75 MG/ML
75 INJECTION, SOLUTION SUBCUTANEOUS
Qty: 1.96 ML | Refills: 11 | Status: SHIPPED | OUTPATIENT
Start: 2020-12-21 | End: 2021-07-01

## 2021-02-04 ENCOUNTER — TRANSCRIBE ORDERS (OUTPATIENT)
Dept: ADMINISTRATIVE | Facility: HOSPITAL | Age: 71
End: 2021-02-04

## 2021-02-04 DIAGNOSIS — Z20.828 EXPOSURE TO SARS-ASSOCIATED CORONAVIRUS: Primary | ICD-10-CM

## 2021-02-05 ENCOUNTER — LAB (OUTPATIENT)
Dept: LAB | Facility: HOSPITAL | Age: 71
End: 2021-02-05

## 2021-02-05 PROCEDURE — C9803 HOPD COVID-19 SPEC COLLECT: HCPCS | Performed by: PHYSICIAN ASSISTANT

## 2021-02-05 PROCEDURE — U0004 COV-19 TEST NON-CDC HGH THRU: HCPCS | Performed by: PHYSICIAN ASSISTANT

## 2021-02-06 LAB — SARS-COV-2 ORF1AB RESP QL NAA+PROBE: NOT DETECTED

## 2021-03-23 ENCOUNTER — APPOINTMENT (RX ONLY)
Dept: URBAN - METROPOLITAN AREA CLINIC 169 | Facility: CLINIC | Age: 71
Setting detail: DERMATOLOGY
End: 2021-03-23

## 2021-03-23 DIAGNOSIS — L81.4 OTHER MELANIN HYPERPIGMENTATION: ICD-10-CM | Status: STABLE

## 2021-03-23 DIAGNOSIS — L82.1 OTHER SEBORRHEIC KERATOSIS: ICD-10-CM | Status: STABLE

## 2021-03-23 DIAGNOSIS — D22 MELANOCYTIC NEVI: ICD-10-CM | Status: STABLE

## 2021-03-23 DIAGNOSIS — Z85.828 PERSONAL HISTORY OF OTHER MALIGNANT NEOPLASM OF SKIN: ICD-10-CM | Status: STABLE

## 2021-03-23 DIAGNOSIS — D18.0 HEMANGIOMA: ICD-10-CM | Status: STABLE

## 2021-03-23 PROBLEM — D18.01 HEMANGIOMA OF SKIN AND SUBCUTANEOUS TISSUE: Status: ACTIVE | Noted: 2021-03-23

## 2021-03-23 PROBLEM — D22.5 MELANOCYTIC NEVI OF TRUNK: Status: ACTIVE | Noted: 2021-03-23

## 2021-03-23 PROCEDURE — ? FULL BODY SKIN EXAM

## 2021-03-23 PROCEDURE — ? COUNSELING

## 2021-03-23 PROCEDURE — 99213 OFFICE O/P EST LOW 20 MIN: CPT

## 2021-03-23 PROCEDURE — ? SUNSCREEN RECOMMENDATIONS

## 2021-03-23 PROCEDURE — ? ADDITIONAL NOTES

## 2021-03-23 ASSESSMENT — LOCATION ZONE DERM: LOCATION ZONE: TRUNK

## 2021-03-23 ASSESSMENT — LOCATION SIMPLE DESCRIPTION DERM
LOCATION SIMPLE: LEFT UPPER BACK
LOCATION SIMPLE: LOWER BACK
LOCATION SIMPLE: UPPER BACK

## 2021-03-23 ASSESSMENT — LOCATION DETAILED DESCRIPTION DERM
LOCATION DETAILED: SUPERIOR LUMBAR SPINE
LOCATION DETAILED: INFERIOR THORACIC SPINE
LOCATION DETAILED: LEFT INFERIOR MEDIAL UPPER BACK

## 2021-03-23 NOTE — PROCEDURE: MIPS QUALITY
Quality 431: Preventive Care And Screening: Unhealthy Alcohol Use - Screening: Patient screened for unhealthy alcohol use using a single question and scores less than 2 times per year
Quality 226: Preventive Care And Screening: Tobacco Use: Screening And Cessation Intervention: Patient screened for tobacco use and is an ex/non-smoker
Detail Level: Detailed
Quality 47: Advance Care Plan: Advance Care Planning discussed and documented in the medical record; patient did not wish or was not able to name a surrogate decision maker or provide an advance care plan.
Quality 111:Pneumonia Vaccination Status For Older Adults: Pneumococcal Vaccination Previously Received

## 2021-07-01 ENCOUNTER — OFFICE VISIT (OUTPATIENT)
Dept: CARDIOLOGY CLINIC | Age: 71
End: 2021-07-01
Payer: MEDICARE

## 2021-07-01 VITALS
WEIGHT: 196 LBS | DIASTOLIC BLOOD PRESSURE: 84 MMHG | HEIGHT: 74 IN | BODY MASS INDEX: 25.15 KG/M2 | SYSTOLIC BLOOD PRESSURE: 132 MMHG | HEART RATE: 65 BPM

## 2021-07-01 DIAGNOSIS — I48.0 PAF (PAROXYSMAL ATRIAL FIBRILLATION) (HCC): Primary | ICD-10-CM

## 2021-07-01 PROCEDURE — G8417 CALC BMI ABV UP PARAM F/U: HCPCS | Performed by: INTERNAL MEDICINE

## 2021-07-01 PROCEDURE — 1123F ACP DISCUSS/DSCN MKR DOCD: CPT | Performed by: INTERNAL MEDICINE

## 2021-07-01 PROCEDURE — 93000 ELECTROCARDIOGRAM COMPLETE: CPT | Performed by: INTERNAL MEDICINE

## 2021-07-01 PROCEDURE — 3017F COLORECTAL CA SCREEN DOC REV: CPT | Performed by: INTERNAL MEDICINE

## 2021-07-01 PROCEDURE — 1036F TOBACCO NON-USER: CPT | Performed by: INTERNAL MEDICINE

## 2021-07-01 PROCEDURE — 99214 OFFICE O/P EST MOD 30 MIN: CPT | Performed by: INTERNAL MEDICINE

## 2021-07-01 PROCEDURE — 4040F PNEUMOC VAC/ADMIN/RCVD: CPT | Performed by: INTERNAL MEDICINE

## 2021-07-01 PROCEDURE — G8427 DOCREV CUR MEDS BY ELIG CLIN: HCPCS | Performed by: INTERNAL MEDICINE

## 2021-07-01 RX ORDER — DIPHENHYDRAMINE HCL 25 MG
25 CAPSULE ORAL EVERY 6 HOURS PRN
COMMUNITY

## 2021-07-01 NOTE — PROGRESS NOTES
HISTORY  68-year-old gentleman with a history of combined hyperlipidemia, labile hypertension, statin allergy/myopathy, and paroxysmal atrial fibrillation returns for routine follow-up visit. First discovered to have atrial fibrillation in 2015, he has been covered with anticoagulant in the form of Xarelto. He developed a significant myopathy with residual muscle weakness to this day consequent to statin therapy. On return today his had no chest discomfort or symptoms of dysrhythmia. He did acquire a Ornelas infection with some loss of taste and smell that he just now returned after about a year. Subsequently has been vaccinated for: 23. PHYSICAL EXAM  On exam he carries 196 pounds on a 6 foot 2 inch frame. Pressure 132/84 with a regular pulse of 65 EOMs full, sclerae and conjunctiva normal. PERRLA. Mask in place. Trachea midline with no neck masses. Assessment of internal jugular veins reveals no elevation of central venous pressure at 45 degrees. Carotid pulses normal without delay or bruit. Thyroid normal to palpation. Chest exam reveals normal respiratory effort, no abnormal breath sounds and normal expiratory phase. No skin lesions seen. PMI normal. S1, S2 normal without murmur or matthew or click. Normal bowel sounds without palpable mass or bruit. No clubbing or acrocyanosis. No significant lower extremity edema or signs of venous insufficiency. He uses a cane to ambulate. Normal range of motion with normal gait. Alert, oriented x 3, memory and cognition normal as reflected by history and conversation. EKG reveals a sinus rhythm with first-degree AV block [226 ms]. ASSESSMENT/PLAN:   1. Paroxysmal atrial fibrillation - no symptomatic recurrence. Continue digoxin and Xarelto  2. Dyslipidemia - most recent values from December , HDL 56, triglycerides 85. No therapy indicated or accepted  3.   Pandemic response - previous infection and subsequent vaccination

## 2021-09-29 ENCOUNTER — APPOINTMENT (RX ONLY)
Dept: URBAN - METROPOLITAN AREA CLINIC 169 | Facility: CLINIC | Age: 71
Setting detail: DERMATOLOGY
End: 2021-09-29

## 2021-09-29 DIAGNOSIS — L81.4 OTHER MELANIN HYPERPIGMENTATION: ICD-10-CM | Status: STABLE

## 2021-09-29 DIAGNOSIS — D18.0 HEMANGIOMA: ICD-10-CM | Status: STABLE

## 2021-09-29 DIAGNOSIS — D22 MELANOCYTIC NEVI: ICD-10-CM | Status: STABLE

## 2021-09-29 DIAGNOSIS — L82.1 OTHER SEBORRHEIC KERATOSIS: ICD-10-CM | Status: STABLE

## 2021-09-29 DIAGNOSIS — Z85.828 PERSONAL HISTORY OF OTHER MALIGNANT NEOPLASM OF SKIN: ICD-10-CM | Status: STABLE

## 2021-09-29 PROBLEM — D18.01 HEMANGIOMA OF SKIN AND SUBCUTANEOUS TISSUE: Status: ACTIVE | Noted: 2021-09-29

## 2021-09-29 PROBLEM — D22.5 MELANOCYTIC NEVI OF TRUNK: Status: ACTIVE | Noted: 2021-09-29

## 2021-09-29 PROCEDURE — ? ADDITIONAL NOTES

## 2021-09-29 PROCEDURE — ? SUNSCREEN RECOMMENDATIONS

## 2021-09-29 PROCEDURE — ? COUNSELING

## 2021-09-29 PROCEDURE — 99213 OFFICE O/P EST LOW 20 MIN: CPT

## 2021-09-29 PROCEDURE — ? FULL BODY SKIN EXAM

## 2021-09-29 ASSESSMENT — LOCATION SIMPLE DESCRIPTION DERM
LOCATION SIMPLE: UPPER BACK
LOCATION SIMPLE: LEFT UPPER BACK
LOCATION SIMPLE: LOWER BACK

## 2021-09-29 ASSESSMENT — LOCATION ZONE DERM: LOCATION ZONE: TRUNK

## 2021-09-29 ASSESSMENT — LOCATION DETAILED DESCRIPTION DERM
LOCATION DETAILED: INFERIOR THORACIC SPINE
LOCATION DETAILED: SUPERIOR LUMBAR SPINE
LOCATION DETAILED: LEFT INFERIOR MEDIAL UPPER BACK

## 2021-12-30 ENCOUNTER — TELEPHONE (OUTPATIENT)
Dept: CARDIOLOGY CLINIC | Age: 71
End: 2021-12-30

## 2022-01-13 ENCOUNTER — OFFICE VISIT (OUTPATIENT)
Dept: CARDIOLOGY CLINIC | Age: 72
End: 2022-01-13
Payer: COMMERCIAL

## 2022-01-13 VITALS
DIASTOLIC BLOOD PRESSURE: 74 MMHG | HEIGHT: 74 IN | SYSTOLIC BLOOD PRESSURE: 100 MMHG | BODY MASS INDEX: 25.85 KG/M2 | HEART RATE: 72 BPM | OXYGEN SATURATION: 91 % | RESPIRATION RATE: 18 BRPM | WEIGHT: 201.4 LBS

## 2022-01-13 DIAGNOSIS — Z79.01 CHRONIC ANTICOAGULATION: ICD-10-CM

## 2022-01-13 DIAGNOSIS — I48.0 PAF (PAROXYSMAL ATRIAL FIBRILLATION) (HCC): Primary | ICD-10-CM

## 2022-01-13 DIAGNOSIS — E78.2 MIXED HYPERLIPIDEMIA: ICD-10-CM

## 2022-01-13 DIAGNOSIS — I10 HYPERTENSION, UNSPECIFIED TYPE: ICD-10-CM

## 2022-01-13 PROCEDURE — 99213 OFFICE O/P EST LOW 20 MIN: CPT | Performed by: NURSE PRACTITIONER

## 2022-01-13 NOTE — PATIENT INSTRUCTIONS
better - A healthy diet is one of your best weapons for fighting cardiovascular disease. When you eat a heart healthy diet, you improve your chances for feeling good and staying healthy for life. 6)  Lose weight - when you shed extra fat an unnecessary pounds, you reduce the burden on your hear, lungs, blood vessels and skeleton. You give yourself the gift of active living, you lower your blood pressure and help yourself feel better. 7) Stop smoking - cigarette smokers have a higher risk of developing cardiovascular disease. If  You smoke, quitting is the best thing you can do for your health. Check American Heart Association on line for more information on Life's Simple 7 and tips for healthy living. A Healthy Heart: Care Instructions  Your Care Instructions     Coronary artery disease, also called heart disease, occurs when a substance called plaque builds up in the vessels that supply oxygen-rich blood to your heart muscle. This can narrow the blood vessels and reduce blood flow. A heart attack happens when blood flow is completely blocked. A high-fat diet, smoking, and other factors increase the risk of heart disease. Your doctor has found that you have a chance of having heart disease. You can do lots of things to keep your heart healthy. It may not be easy, but you can change your diet, exercise more, and quit smoking. These steps really work to lower your chance of heart disease. Follow-up care is a key part of your treatment and safety. Be sure to make and go to all appointments, and call your doctor if you are having problems. It's also a good idea to know your test results and keep a list of the medicines you take. How can you care for yourself at home? Diet  · Use less salt when you cook and eat. This helps lower your blood pressure. Taste food before salting. Add only a little salt when you think you need it. With time, your taste buds will adjust to less salt.   · Eat fewer snack items, fast foods, canned soups, and other high-salt, high-fat, processed foods. · Read food labels and try to avoid saturated and trans fats. They increase your risk of heart disease by raising cholesterol levels. · Limit the amount of solid fat-butter, margarine, and shortening-you eat. Use olive, peanut, or canola oil when you cook. Bake, broil, and steam foods instead of frying them. · Eat a variety of fruit and vegetables every day. Dark green, deep orange, red, or yellow fruits and vegetables are especially good for you. Examples include spinach, carrots, peaches, and berries. · Foods high in fiber can reduce your cholesterol and provide important vitamins and minerals. High-fiber foods include whole-grain cereals and breads, oatmeal, beans, brown rice, citrus fruits, and apples. · Eat lean proteins. Heart-healthy proteins include seafood, lean meats and poultry, eggs, beans, peas, nuts, seeds, and soy products. · Limit drinks and foods with added sugar. These include candy, desserts, and soda pop. Lifestyle changes  · If your doctor recommends it, get more exercise. Walking is a good choice. Bit by bit, increase the amount you walk every day. Try for at least 30 minutes on most days of the week. You also may want to swim, bike, or do other activities. · Do not smoke. If you need help quitting, talk to your doctor about stop-smoking programs and medicines. These can increase your chances of quitting for good. Quitting smoking may be the most important step you can take to protect your heart. It is never too late to quit. · Limit alcohol to 2 drinks a day for men and 1 drink a day for women. Too much alcohol can cause health problems. · Manage other health problems such as diabetes, high blood pressure, and high cholesterol. If you think you may have a problem with alcohol or drug use, talk to your doctor. Medicines  · Take your medicines exactly as prescribed.  Call your doctor if you think you are having a problem with your medicine. · If your doctor recommends aspirin, take the amount directed each day. Make sure you take aspirin and not another kind of pain reliever, such as acetaminophen (Tylenol). When should you call for help? [id number] if you have symptoms of a heart attack. These may include:  · Chest pain or pressure, or a strange feeling in the chest.  · Sweating. · Shortness of breath. · Pain, pressure, or a strange feeling in the back, neck, jaw, or upper belly or in one or both shoulders or arms. · Lightheadedness or sudden weakness. · A fast or irregular heartbeat. After you call 911, the  may tell you to chew 1 adult-strength or 2 to 4 low-dose aspirin. Wait for an ambulance. Do not try to drive yourself. Watch closely for changes in your health, and be sure to contact your doctor if you have any problems. Where can you learn more? Go to https://[employer]. org and sign in to your [employer] account. Enter K986 in the [employer] box to learn more about \"A Healthy Heart: Care Instructions. \"     If you do not have an account, please click on the \"Sign Up Now\" link. Current as of: December 16, 2019               Content Version: 12.5  © 1354-9151 Healthwise, Incorporated. Care instructions adapted under license by [facility]. If you have questions about a medical condition or this instruction, always ask your healthcare professional. [place] [address] any warranty or liability for your use of this information.

## 2022-01-13 NOTE — PROGRESS NOTES
[facility], [place]. [address] [facility]  [phone] office  [phone] fax      OFFICE VISIT:  2022    Farhad Sierra - : 1950  Reason For Visit:  Yolis Perez is a 66 y.o. male who is here for Follow-up (6 mos- PAF)    History:   Diagnosis Orders   1. PAF (paroxysmal atrial fibrillation) ([facility] [address]     2. Chronic anticoagulation      Xarelto   3. Hypertension, unspecified type     4. Mixed hyperlipidemia       The patient presents today for cardiology follow up. The patient contracted COVID 19 the week before [place]. He has been vaccinated, but not his wife had not. The patient reports \"we both got really sick, especially my wife. \"  The patient denies symptoms to suggest myocardial ischemia, heart failure or arrhythmia. BP is well controlled on current regimen. The patient's PCP monitors cholesterol. No bleeding issue reported on Xarelto. Subjective  Yolis Perez denies exertional chest pain, shortness of breath, orthopnea, paroxysmal nocturnal dyspnea, syncope, presyncope, sensed arrhythmia, edema and fatigue. The patient denies numbness or weakness to suggest cerebrovascular accident or transient ischemic attack.       Farhad Sierra has the following history as recorded in [facility]:  Patient Active Problem List   Diagnosis Code    PA[facility]ox[facility]mal [facility] fibrillation) ([facility]) I48.0    HTN (hypertension) I10    Hyperlipidemia E78.5    Chronic anticoagulation Z79.01     Past Medical History:   Diagnosis Date    A-fib ([facility] [address]     2015 new onset    Allergic rhinitis     Reyes esophagus     GERD (gastroesophageal reflux disease)     HTN (hypertension)     Hyperlipidemia      Past Surgical History:   Procedure Laterality Date    APPENDECTOMY      CATARACT REMOVAL WITH IMPLANT      COLONOSCOPY      CYST REMOVAL      FOOT SURGERY Left     TESTICLE SURGERY       Family History   Problem Relation Age of Onset    Heart Disease Paternal Grandmother     Heart Disease Father      Social History     Tobacco Use    Smoking status: Never Smoker    Smokeless tobacco: Never Used   Substance Use Topics    Alcohol use: Yes     Alcohol/week: 0.0 standard drinks      Current Outpatient Medications   Medication Sig Dispense Refill    COVID-19 mRNA Virus Vaccine (MODERNA COVID-19 VACCINE IM) Inject into the muscle Both doses      diphenhydrAMINE (BENADRYL) 25 MG capsule Take 25 mg by mouth every 6 hours as needed for Itching or Allergies      rivaroxaban (XARELTO) 20 MG TABS tablet Take 1 tablet by mouth Daily with supper 90 tablet 3    digoxin (LANOXIN) 250 MCG tablet TAKE 1 TABLET BY MOUTH ONCE DAILY. STOP ATENOLOL. 30 tablet 5    cetirizine (ZYRTEC) 10 MG tablet Take 10 mg by mouth daily      tamsulosin (FLOMAX) 0.4 MG capsule Take 0.4 mg by mouth daily      pantoprazole (PROTONIX) 40 MG tablet Take 40 mg by mouth daily      Multiple Vitamins-Minerals (THERAPEUTIC MULTIVITAMIN-MINERALS) tablet Take 1 tablet by mouth daily       No current facility-administered medications for this visit. Allergies: Statins    Review of Systems  Constitutional  no appetite change, or unexpected weight change. No fever, chills or diaphoresis. No significant change in activity level or new onset of fatigue. HEENT  no significant rhinorrhea or epistaxis. No tinnitus or significant hearing loss. Eyes  no sudden vision change or amaurosis. No corneal arcus, xantholasma, subconjunctival hemorrhage or discharge. Respiratory  no significant wheezing, stridor, apnea or cough. No dyspnea on exertion or shortness of air. Cardiovascular  no exertional chest pain to suggest myocardial ischemia. No orthopnea or PND. No sensation of sustained arrythmia. No occurrence of slow heart rate. No palpitations. No claudication. Gastrointestinal  no abdominal swelling or pain. No blood in stool.  No severe constipation, diarrhea, nausea, or vomiting. Genitourinary  no dysuria, frequency, or urgency. No flank pain or hematuria. Musculoskeletal  no back pain or myalgia. No problems with gait. Extremities - no clubbing, cyanosis or extremity edema. Skin  no color change or rash. No pallor. No new surgical incision. Neurologic  no speech difficulty, facial asymmetry or lateralizing weakness. No seizures, presyncope or syncope. No significant dizziness. Hematologic  no easy bruising or excessive bleeding. Psychiatric  no severe anxiety or insomnia. No confusion. All other review of systems are negative. Objective  Vital Signs - /74 (Site: Right Upper Arm, Position: Sitting, Cuff Size: Medium Adult)   Pulse 72   Resp 18   Ht 6' 2\" (1.88 m)   Wt 201 lb 6.4 oz (91.4 kg)   SpO2 91%   BMI 25.86 kg/m²   General - Liana Smalls is alert, cooperative, and pleasant. Well groomed. No acute distress. Body habitus - Body mass index is 25.86 kg/m². HEENT  Head is normocephalic. No circumoral cyanosis. Dentition is normal.  EYES -   Lids normal without ptosis. No discharge, edema or subconjunctival hemorrhage. Neck - Symmetrical without apparent mass or lymphadenopathy. Respiratory - Normal respiratory effort without use of accessory muscles. Ausculatation reveals vesicular breath sounds without crackles, wheezes, rub or rhonchi. Cardiovascular  No jugular venous distention. Auscultation reveals regular rate and rhythm. No audible clicks, gallop or rub. No murmur. No lower extremity varicosities. No carotid bruits. Abdominal -  No visible distention, mass or pulsations. Extremities - No clubbing or cyanosis. No statis dermatitis or ulcers. No edema. Musculoskeletal -   No Osler's nodes. No kyphosis or scoliosis. Gait is even and regular without limp or shuffle. Ambulates without assistance. Skin -  Warm and dry; no rash or pallor. No new surgical wound.   Neurological - No focal neurological deficits. Thought processes coherent. No apparent tremor. Oriented to person, place and time. Psychiatric -  Appropriate affect and mood. Data reviewed:  Lab Results   Component Value Date     CHOL 232 (H) 12/03/2020            Lab Results   Component Value Date     TRIG 85 12/03/2020            Lab Results   Component Value Date     HDL 56 12/03/2020            Lab Results   Component Value Date     LDLCALC 159 12/03/2020               Lab Results   Component Value Date     ALT 27 12/03/2020       BP Readings from Last 3 Encounters:   01/13/22 100/74   07/01/21 132/84   12/07/20 132/80    Pulse Readings from Last 3 Encounters:   01/13/22 72   07/01/21 65   12/07/20 70        Wt Readings from Last 3 Encounters:   01/13/22 201 lb 6.4 oz (91.4 kg)   07/01/21 196 lb (88.9 kg)   12/07/20 198 lb (89.8 kg)     Assessment/Plan:   Diagnosis Orders   1. PAF (paroxysmal atrial fibrillation) ([facility] [address]     2. Chronic anticoagulation      Xarelto   3. Hypertension, unspecified type     4. Mixed hyperlipidemia       [id number] Score: 2  Disclaimer: Risk Score calculation is dependent on accuracy of patient problem list and past encounter diagnosis. Stable CV status without overt heart failure, sensed arrhythmia or angina. PAF - no recurrent AF. Continues on Xarelto with no bleeding issues. HTN -normotensive on current regimen. Hyperlipidemia - . Patient is not taking Repatha. Hx statin intolerance. Patient is compliant with medication regimen. Previous cardiac history and records reviewed. Continue current medical management for cardiac related condition. Continue other current medications as directed. Continue to follow up with primary care provider for non cardiac medical problems. If your primary care provider is outside of the [facility], please request that your labs be faxed to this office at [phone]. BP goal 130/80 or less.   Call the office with any problems, questions or concerns at [phone]. Cardiology follow up as scheduled in [address] appointments. Educational included in patient instructions. Heart health.       JULIA Tan

## 2022-01-25 ENCOUNTER — OFFICE VISIT (OUTPATIENT)
Dept: GASTROENTEROLOGY | Facility: CLINIC | Age: 72
End: 2022-01-25

## 2022-01-25 VITALS
HEART RATE: 90 BPM | BODY MASS INDEX: 25.67 KG/M2 | SYSTOLIC BLOOD PRESSURE: 132 MMHG | WEIGHT: 200 LBS | TEMPERATURE: 96.9 F | HEIGHT: 74 IN | DIASTOLIC BLOOD PRESSURE: 68 MMHG | OXYGEN SATURATION: 98 %

## 2022-01-25 DIAGNOSIS — Z86.010 HX OF ADENOMATOUS COLONIC POLYPS: ICD-10-CM

## 2022-01-25 DIAGNOSIS — K21.00 GASTROESOPHAGEAL REFLUX DISEASE WITH ESOPHAGITIS WITHOUT HEMORRHAGE: ICD-10-CM

## 2022-01-25 DIAGNOSIS — Z79.01 ANTICOAGULATED: ICD-10-CM

## 2022-01-25 DIAGNOSIS — K22.70 BARRETT'S ESOPHAGUS WITHOUT DYSPLASIA: Primary | ICD-10-CM

## 2022-01-25 DIAGNOSIS — I10 HTN (HYPERTENSION), BENIGN: ICD-10-CM

## 2022-01-25 PROCEDURE — 99203 OFFICE O/P NEW LOW 30 MIN: CPT | Performed by: CLINICAL NURSE SPECIALIST

## 2022-01-25 NOTE — PROGRESS NOTES
Chief Complaint   Patient presents with   • Endoscopy   • Colonoscopy     Subjective   HPI  Bobby Christina is a 71 y.o. male who presents with hx of Barretts esophagus determined by biopsy. Course is constant.  Disease is stable , maintains on Protonix for the management of this. Last Endoscopy reviewed. He has no complaints of nausea or vomiting. No change in bowels. No wt loss. No BRBPR. No melena. No abdominal pain or dysphagia.    Past Medical History:   Diagnosis Date   • Allergic rhinitis    • Arthritis    • Garza's esophagus    • GERD (gastroesophageal reflux disease)    • Hx of colonic polyps    • Hyperlipidemia      Past Surgical History:   Procedure Laterality Date   • COLONOSCOPY W/ POLYPECTOMY  01/19/2015    Tubular adenomatous polyp ascending colon, Polyp at 40 cm insufficient tissue for diagnosis, Diverticulosis repeat exam in 5 years   • ENDOSCOPY  03/23/2015    Intestinal Metaplasia mild to moderate chronic inflammation, HH repeat exam in 3 years   • UPPER GASTROINTESTINAL ENDOSCOPY  12/13/2010    [facility]           Current Outpatient Medications:   •  digoxin (LANOXIN) 250 MCG tablet, TAKE ONE TABLET BY MOUTH ONCE DAILY, Disp: , Rfl:   •  lisinopril (PRINIVIL,ZESTRIL) 5 MG tablet, Take 5 mg by mouth., Disp: , Rfl:   •  pantoprazole (PROTONIX) 40 MG EC tablet, Take 40 mg by mouth., Disp: , Rfl:   •  rivaroxaban (XARELTO) 20 MG tablet, TAKE ONE TABLET BY MOUTH ONCE DAILY WITH BREAKFAST, Disp: , Rfl:   •  tamsulosin (FLOMAX) 0.4 MG capsule 24 hr capsule, Take 1 capsule by mouth Daily., Disp: 30 capsule, Rfl: 11  •  therapeutic multivitamin-minerals (THERAGRAN-M) tablet, Take  by mouth., Disp: , Rfl:   •  polyethylene glycol (GoLYTELY) 236 g solution, Take as directed by office instructions., Disp: 4000 mL, Rfl: 0  Allergies   Allergen Reactions   • Statins Other (See Comments)     Muscle weakness bilat lower legs     Social History     Socioeconomic History   • Marital status:    Tobacco  Use   • Smoking status: Never Smoker   • Smokeless tobacco: Never Used   Substance and Sexual Activity   • Alcohol use: Yes     Comment: Moderate   • Drug use: No   • Sexual activity: Defer     Family History   Problem Relation Age of Onset   • Colon polyps Brother    • Ulcerative colitis Brother    • Colon cancer Neg Hx      Health Maintenance   Topic Date Due   • TDAP/TD VACCINES (1 - Tdap) Never done   • Pneumococcal Vaccine 65+ (1 of 1 - PPSV23) Never done   • HEPATITIS C SCREENING  Never done   • ANNUAL WELLNESS VISIT  12/17/2019   • COVID-19 Vaccine (3 - Booster for Pfizer series) 10/04/2021   • LIPID PANEL  12/03/2021   • COLORECTAL CANCER SCREENING  01/19/2025   • INFLUENZA VACCINE  Completed   • ZOSTER VACCINE  Completed     Review of Systems   Constitutional: Negative for activity change, appetite change, chills, diaphoresis, fatigue, fever and unexpected weight change.   HENT: Negative for ear pain, hearing loss, mouth sores, sore throat, trouble swallowing and voice change.    Eyes: Negative.    Respiratory: Negative for cough, choking, shortness of breath and wheezing.    Cardiovascular: Negative for chest pain and palpitations.   Gastrointestinal: Negative for abdominal pain, blood in stool, constipation, diarrhea, nausea and vomiting.   Endocrine: Negative for cold intolerance and heat intolerance.   Genitourinary: Negative for decreased urine volume, dysuria, frequency, hematuria and urgency.   Musculoskeletal: Negative for back pain, gait problem and myalgias.   Skin: Negative for color change, pallor and rash.   Allergic/Immunologic: Negative for food allergies and immunocompromised state.   Neurological: Negative for dizziness, tremors, seizures, syncope, weakness, light-headedness, numbness and headaches.   Hematological: Negative for adenopathy. Does not bruise/bleed easily.   Psychiatric/Behavioral: Negative for agitation and confusion. The patient is not nervous/anxious.    All other systems  "reviewed and are negative.    Objective   Vitals:    01/25/22 0928   BP: 132/68   Pulse: 90   Temp: 96.9 °F (36.1 °C)   SpO2: 98%   Weight: 90.7 kg (200 lb)   Height: 188 cm (74\")     Body mass index is 25.68 kg/m².    Physical Exam  Constitutional:       Appearance: He is well-developed.   HENT:      Head: Normocephalic and atraumatic.   Eyes:      Pupils: Pupils are equal, round, and reactive to light.   Neck:      Trachea: No tracheal deviation.   Cardiovascular:      Rate and Rhythm: Normal rate and regular rhythm.      Heart sounds: Normal heart sounds. No murmur heard.  No friction rub. No gallop.    Pulmonary:      Effort: Pulmonary effort is normal. No respiratory distress.      Breath sounds: Normal breath sounds. No wheezing or rales.   Chest:      Chest wall: No tenderness.   Abdominal:      General: Bowel sounds are normal. There is no distension.      Palpations: Abdomen is soft. Abdomen is not rigid.      Tenderness: There is no abdominal tenderness. There is no guarding or rebound.   Musculoskeletal:         General: No tenderness or deformity. Normal range of motion.      Cervical back: Normal range of motion and neck supple.   Skin:     General: Skin is warm and dry.      Coloration: Skin is not pale.      Findings: No rash.   Neurological:      Mental Status: He is alert and oriented to person, place, and time.      Deep Tendon Reflexes: Reflexes are normal and symmetric.   Psychiatric:         Behavior: Behavior normal.         Thought Content: Thought content normal.         Judgment: Judgment normal.         Assessment/Plan   Diagnoses and all orders for this visit:    1. Garza's esophagus without dysplasia (Primary)  -     Case Request; Standing  -     COVID PRE-OP / PRE-PROCEDURE SCREENING ORDER (NO ISOLATION) - Swab, Nasal Cavity; Future  -     Follow Anesthesia Guidelines / Standing Orders; Future  -     Obtain Informed Consent; Future  -     Case Request  -     polyethylene glycol " (GoLYTELY) 236 g solution; Take as directed by office instructions.  Dispense: 4000 mL; Refill: 0    2. Gastroesophageal reflux disease with esophagitis without hemorrhage    3. HTN (hypertension), benign  Comments:  cont BP medication the day of procedure    4. Hx of adenomatous colonic polyps    5. Anticoagulated  Comments:  Xarelto to hold per [facility] He sees Cammynia Lam     I discussed non pharmaceutical treatment of gerd.  This includes gradually losing weight to achieve ideal body wt., elevation of the head of bed by 4-6 inches, nothing to eat or drink 3 hours prior to lying down, avoiding tight clothing, stress reduction, tobacco cessation, reduction of alcohol intake, and dietary restrictions (avoiding caffeine, coffee, fatty foods, mints, chocolate, spicy foods and tomato based sauces as much as possible).    Continue long term PPI therapy  Add Gaviscon for any breakthrough reflux symptoms.     ESOPHAGOGASTRODUODENOSCOPY WITH ANESTHESIA (N/A), COLONOSCOPY WITH ANESTHESIA (N/A)  Patient's Body mass index is 25.68 kg/m². indicating that he is overweight (BMI 25-29.9). Obesity-related health conditions include the following: hypertension. Obesity is unchanged. BMI is is above average; BMI management plan is completed. We discussed portion control and increasing exercise..      Bia Bhagat, APRN  1/25/2022  10:01 CST      IF YOU SMOKE OR USE TOBACCO PLEASE READ THE FOLLOWING:   3.5 minutes provided    Why is smoking bad for me?  Smoking increases the risk of heart disease, lung disease, vascular disease, stroke, and cancer.     If you smoke, STOP!    If you would like more information on quitting smoking, please visit the [employer] website: www.[email or web address]/healthier-[email or web address]   This link will provide additional resources including the QUIT line and the Beat the Pack support groups.     For more information:    Quit Now  [place]  1-800-QUIT-NOW  https://[place].quitlogix.org/en-[place]/    Obesity, Adult  Obesity is having too much body fat. If you have a BMI of 30 or more, you are obese. BMI is a number that explains how much body fat you have. Obesity is often caused by taking in (consuming) more calories than your body uses.  Obesity can cause serious health problems. Changing your lifestyle can help to treat obesity.  Follow these instructions at home:  Eating and drinking     · Follow advice from your doctor about what to eat and drink. Your doctor may tell you to:  ¨ Cut down on (limit) fast foods, sweets, and processed snack foods.  ¨ Choose low-fat options. For example, choose low-fat milk instead of whole milk.  ¨ Eat 5 or more servings of fruits or vegetables every day.  ¨ Eat at home more often. This gives you more control over what you eat.  ¨ Choose healthy foods when you eat out.  ¨ Learn what a healthy portion size is. A portion size is the amount of a certain food that is healthy for you to eat at one time. This is different for each person.  ¨ Keep low-fat snacks available.  ¨ Avoid sugary drinks. These include soda, fruit juice, iced tea that is sweetened with sugar, and flavored milk.  ¨ Eat a healthy breakfast.  · Drink enough water to keep your pee (urine) clear or pale yellow.  · Do not go without eating for long periods of time (do not fast).  · Do not go on popular or trendy diets (fad diets).  Physical Activity   · Exercise often, as told by your doctor. Ask your doctor:  ¨ What types of exercise are safe for you.  ¨ How often you should exercise.  · Warm up and stretch before being active.  · Do slow stretching after being active (cool down).  · Rest between times of being active.  Lifestyle   · Limit how much time you spend in front of your TV, computer, or video game system (be less sedentary).  · Find ways to reward yourself that do not involve food.  · Limit alcohol intake to no more than 1 drink a day for  nonpregnant women and 2 drinks a day for men. One drink equals 12 oz of beer, 5 oz of wine, or 1½ oz of hard liquor.  General instructions   · Keep a weight loss journal. This can help you keep track of:  ¨ The food that you eat.  ¨ The exercise that you do.  · Take over-the-counter and prescription medicines only as told by your doctor.  · Take vitamins and supplements only as told by your doctor.  · Think about joining a support group. Your doctor may be able to help with this.  · Keep all follow-up visits as told by your doctor. This is important.  Contact a doctor if:  · You cannot meet your weight loss goal after you have changed your diet and lifestyle for 6 weeks.  This information is not intended to replace advice given to you by your health care provider. Make sure you discuss any questions you have with your health care provider.  Document Released: 03/11/2013 Document Revised: 05/25/2017 Document Reviewed: 10/05/2016  Elsevier Interactive Patient Education © 2017 [employer]

## 2022-02-07 RX ORDER — RIVAROXABAN 20 MG/1
TABLET, FILM COATED ORAL
Qty: 90 TABLET | Refills: 3 | Status: SHIPPED | OUTPATIENT
Start: 2022-02-07

## 2022-02-16 DIAGNOSIS — R97.20 ELEVATED PROSTATE SPECIFIC ANTIGEN (PSA): Primary | ICD-10-CM

## 2022-02-17 LAB — PSA SERPL-MCNC: 5.1 NG/ML (ref 0–4)

## 2022-02-24 ENCOUNTER — TELEPHONE (OUTPATIENT)
Dept: CARDIOLOGY CLINIC | Age: 72
End: 2022-02-24

## 2022-02-24 NOTE — TELEPHONE ENCOUNTER
Date: 3-1-22    Cardiologist: Dr. Kris Oglesby    Procedure: EGD and colonoscopy     Surgeon: Dr. Maik Delgado Office Visit: 1-13-22    Reason for office visit and medical concerns addressed at this office visit: PAF, HTN, Hyperlipidemia    Testing Performed and Date of Service:  EKG 7-1-21    Does the patient have a stent? If so, what type? Not in last year     Current Medications: Xarelto, digoxin, zyrtec, flomax, protonix    Is the patient currently taking an anticoagulant?  If so, what is the diagnosis the patient has been given to warrant the need for the anticoagulant? xarelto    Additional Notes: med hold on xarelto

## 2022-02-25 ENCOUNTER — OFFICE VISIT (OUTPATIENT)
Dept: UROLOGY | Facility: CLINIC | Age: 72
End: 2022-02-25

## 2022-02-25 VITALS — BODY MASS INDEX: 25.67 KG/M2 | WEIGHT: 200 LBS | TEMPERATURE: 97.8 F | HEIGHT: 74 IN

## 2022-02-25 DIAGNOSIS — N40.0 BENIGN PROSTATIC HYPERPLASIA WITHOUT LOWER URINARY TRACT SYMPTOMS: ICD-10-CM

## 2022-02-25 DIAGNOSIS — R97.20 ELEVATED PROSTATE SPECIFIC ANTIGEN (PSA): Primary | ICD-10-CM

## 2022-02-25 PROCEDURE — 99214 OFFICE O/P EST MOD 30 MIN: CPT | Performed by: UROLOGY

## 2022-02-25 PROCEDURE — 81003 URINALYSIS AUTO W/O SCOPE: CPT | Performed by: UROLOGY

## 2022-02-25 RX ORDER — FINASTERIDE 5 MG/1
5 TABLET, FILM COATED ORAL DAILY
Qty: 90 TABLET | Refills: 3 | Status: SHIPPED | OUTPATIENT
Start: 2022-02-25

## 2022-02-25 NOTE — PROGRESS NOTES
Subjective    Mr. Christina is 71 y.o. male    Chief Complaint: Elevated PSA    History of Present Illness  71-year-old male established patient on chronic anticoagulation with Xarelto previously seen by Dr. Puri for history of elevated PSA follows up for the same issue.  His PSA on 2/16/2022 was noted to be elevated at 5.1.  However, he has had a PSA level in the past of 5.3 in 2018.  He denies bothersome LUTS, hematuria, or flank pain.    Lab Results   Component Value Date    PSA 5.100 (H) 02/16/2022    PSA 3.790 07/10/2019    PSA 3.700 01/18/2019       The following portions of the patient's history were reviewed and updated as appropriate: allergies, current medications, past family history, past medical history, past social history, past surgical history and problem list.    Review of Systems      Current Outpatient Medications:   •  pantoprazole (PROTONIX) 40 MG EC tablet, Take 40 mg by mouth., Disp: , Rfl:   •  polyethylene glycol (GoLYTELY) 236 g solution, Take as directed by office instructions., Disp: 4000 mL, Rfl: 0  •  rivaroxaban (XARELTO) 20 MG tablet, TAKE ONE TABLET BY MOUTH ONCE DAILY WITH BREAKFAST, Disp: , Rfl:   •  tamsulosin (FLOMAX) 0.4 MG capsule 24 hr capsule, Take 1 capsule by mouth Daily., Disp: 30 capsule, Rfl: 11  •  therapeutic multivitamin-minerals (THERAGRAN-M) tablet, Take  by mouth., Disp: , Rfl:   •  digoxin (LANOXIN) 250 MCG tablet, TAKE ONE TABLET BY MOUTH ONCE DAILY, Disp: , Rfl:   •  finasteride (PROSCAR) 5 MG tablet, Take 1 tablet by mouth Daily., Disp: 90 tablet, Rfl: 3  •  lisinopril (PRINIVIL,ZESTRIL) 5 MG tablet, Take 5 mg by mouth., Disp: , Rfl:     Past Medical History:   Diagnosis Date   • Allergic rhinitis    • Arthritis    • Garza's esophagus    • GERD (gastroesophageal reflux disease)    • Hx of colonic polyps    • Hyperlipidemia        Past Surgical History:   Procedure Laterality Date   • COLONOSCOPY W/ POLYPECTOMY  01/19/2015    Tubular adenomatous polyp ascending  "colon, Polyp at 40 cm insufficient tissue for diagnosis, Diverticulosis repeat exam in 5 years   • ENDOSCOPY  03/23/2015    Intestinal Metaplasia mild to moderate chronic inflammation, HH repeat exam in 3 years   • UPPER GASTROINTESTINAL ENDOSCOPY  12/13/2010    [facility]       Social History     Socioeconomic History   • Marital status:    Tobacco Use   • Smoking status: Never Smoker   • Smokeless tobacco: Never Used   Substance and Sexual Activity   • Alcohol use: Yes     Comment: Moderate   • Drug use: No   • Sexual activity: Defer       Family History   Problem Relation Age of Onset   • Colon polyps Brother    • Ulcerative colitis Brother    • Colon cancer Neg Hx        Objective    Temp 97.8 °F (36.6 °C)   Ht 188 cm (74\")   Wt 90.7 kg (200 lb)   BMI 25.68 kg/m²     Physical Exam        Results for orders placed or performed in visit on 02/24/22   POC Urinalysis Dipstick, Multipro    Specimen: Urine   Result Value Ref Range    Color Yellow Yellow, Straw, Dark Yellow, Deann    Clarity, UA Clear Clear    Glucose, UA Negative Negative, 1000 mg/dL (3+) mg/dL    Bilirubin Negative Negative    Ketones, UA Negative Negative    Specific Gravity  1.015 1.005 - 1.030    Blood, UA Trace (A) Negative    pH, Urine 7.5 5.0 - 8.0    Protein, POC Trace (A) Negative mg/dL    Urobilinogen, UA 4 E.U./dL  (A) Normal    Nitrite, UA Negative Negative    Leukocytes Negative Negative     Assessment and Plan    Diagnoses and all orders for this visit:    1. Elevated prostate specific antigen (PSA) (Primary)  -     PSA DIAGNOSTIC; Future    2. Benign prostatic hyperplasia without lower urinary tract symptoms  -     finasteride (PROSCAR) 5 MG tablet; Take 1 tablet by mouth Daily.  Dispense: 90 tablet; Refill: 3        Mildly elevated PSA likely commensurate with his age and history of enlarged prostate stable from 2018.  We discussed options for prostate biopsy now, close PSA surveillance, or initiating finasteride for his " "enlarged prostate.  He will follow-up with me in 6 months with preclinic PSA.  If PSA has not fallen by the \"expected\" 50% on finasteride, we will further discuss prostate biopsy.      This document has been signed by KIANNA Porter MD on February 28, 2022 11:27 CST            "

## 2022-02-26 ENCOUNTER — LAB (OUTPATIENT)
Dept: LAB | Facility: HOSPITAL | Age: 72
End: 2022-02-26

## 2022-02-26 DIAGNOSIS — K22.70 BARRETT'S ESOPHAGUS WITHOUT DYSPLASIA: ICD-10-CM

## 2022-02-26 LAB — SARS-COV-2 ORF1AB RESP QL NAA+PROBE: NOT DETECTED

## 2022-02-26 PROCEDURE — U0004 COV-19 TEST NON-CDC HGH THRU: HCPCS

## 2022-02-26 PROCEDURE — C9803 HOPD COVID-19 SPEC COLLECT: HCPCS

## 2022-03-01 ENCOUNTER — ANESTHESIA (OUTPATIENT)
Dept: GASTROENTEROLOGY | Facility: HOSPITAL | Age: 72
End: 2022-03-01

## 2022-03-01 ENCOUNTER — ANESTHESIA EVENT (OUTPATIENT)
Dept: GASTROENTEROLOGY | Facility: HOSPITAL | Age: 72
End: 2022-03-01

## 2022-03-01 ENCOUNTER — HOSPITAL ENCOUNTER (OUTPATIENT)
Facility: HOSPITAL | Age: 72
Setting detail: HOSPITAL OUTPATIENT SURGERY
Discharge: HOME | End: 2022-03-01
Attending: INTERNAL MEDICINE | Admitting: INTERNAL MEDICINE

## 2022-03-01 VITALS
OXYGEN SATURATION: 98 % | DIASTOLIC BLOOD PRESSURE: 86 MMHG | BODY MASS INDEX: 25.15 KG/M2 | SYSTOLIC BLOOD PRESSURE: 123 MMHG | TEMPERATURE: 97.4 F | HEART RATE: 73 BPM | WEIGHT: 196 LBS | HEIGHT: 74 IN | RESPIRATION RATE: 15 BRPM

## 2022-03-01 DIAGNOSIS — K22.70 BARRETT'S ESOPHAGUS WITHOUT DYSPLASIA: ICD-10-CM

## 2022-03-01 PROCEDURE — 43239 EGD BIOPSY SINGLE/MULTIPLE: CPT | Performed by: INTERNAL MEDICINE

## 2022-03-01 PROCEDURE — 45385 COLONOSCOPY W/LESION REMOVAL: CPT | Performed by: INTERNAL MEDICINE

## 2022-03-01 PROCEDURE — 88305 TISSUE EXAM BY PATHOLOGIST: CPT | Performed by: INTERNAL MEDICINE

## 2022-03-01 PROCEDURE — 25010000002 PROPOFOL 10 MG/ML EMULSION: Performed by: NURSE ANESTHETIST, CERTIFIED REGISTERED

## 2022-03-01 RX ORDER — PROPOFOL 10 MG/ML
VIAL (ML) INTRAVENOUS AS NEEDED
Status: DISCONTINUED | OUTPATIENT
Start: 2022-03-01 | End: 2022-03-01 | Stop reason: SURG

## 2022-03-01 RX ORDER — SODIUM CHLORIDE 9 MG/ML
100 INJECTION, SOLUTION INTRAVENOUS CONTINUOUS
Status: CANCELLED | OUTPATIENT
Start: 2022-03-01

## 2022-03-01 RX ORDER — SODIUM CHLORIDE 0.9 % (FLUSH) 0.9 %
10 SYRINGE (ML) INJECTION AS NEEDED
Status: DISCONTINUED | OUTPATIENT
Start: 2022-03-01 | End: 2022-03-01 | Stop reason: HOSPADM

## 2022-03-01 RX ORDER — SODIUM CHLORIDE 0.9 % (FLUSH) 0.9 %
10 SYRINGE (ML) INJECTION AS NEEDED
Status: CANCELLED | OUTPATIENT
Start: 2022-03-01

## 2022-03-01 RX ORDER — SODIUM CHLORIDE 9 MG/ML
500 INJECTION, SOLUTION INTRAVENOUS CONTINUOUS PRN
Status: DISCONTINUED | OUTPATIENT
Start: 2022-03-01 | End: 2022-03-01 | Stop reason: HOSPADM

## 2022-03-01 RX ORDER — LIDOCAINE HYDROCHLORIDE 10 MG/ML
0.5 INJECTION, SOLUTION EPIDURAL; INFILTRATION; INTRACAUDAL; PERINEURAL ONCE AS NEEDED
Status: DISCONTINUED | OUTPATIENT
Start: 2022-03-01 | End: 2022-03-01 | Stop reason: HOSPADM

## 2022-03-01 RX ORDER — LIDOCAINE HYDROCHLORIDE 20 MG/ML
INJECTION, SOLUTION EPIDURAL; INFILTRATION; INTRACAUDAL; PERINEURAL AS NEEDED
Status: DISCONTINUED | OUTPATIENT
Start: 2022-03-01 | End: 2022-03-01 | Stop reason: SURG

## 2022-03-01 RX ORDER — SODIUM CHLORIDE 0.9 % (FLUSH) 0.9 %
10 SYRINGE (ML) INJECTION EVERY 12 HOURS SCHEDULED
Status: CANCELLED | OUTPATIENT
Start: 2022-03-01

## 2022-03-01 RX ADMIN — PROPOFOL 400 MG: 10 INJECTION, EMULSION INTRAVENOUS at 11:26

## 2022-03-01 RX ADMIN — LIDOCAINE HYDROCHLORIDE 200 MG: 20 INJECTION, SOLUTION EPIDURAL; INFILTRATION; INTRACAUDAL; PERINEURAL at 11:26

## 2022-03-01 RX ADMIN — SODIUM CHLORIDE 500 ML: 9 INJECTION, SOLUTION INTRAVENOUS at 09:54

## 2022-03-01 NOTE — ANESTHESIA POSTPROCEDURE EVALUATION
"Patient: Bobby Christina    Procedure Summary     Date: 03/01/22 Room / Location:  [facility] /  [facility]    Anesthesia Start: 1124 Anesthesia Stop: 1155    Procedures:       ESOPHAGOGASTRODUODENOSCOPY WITH ANESTHESIA (N/A )      COLONOSCOPY WITH ANESTHESIA (N/A ) Diagnosis:       Garza's esophagus without dysplasia      (Garza's esophagus without dysplasia [K22.70])    Surgeons: Bernardo Swann MD Provider: Odalys Bennett CRNA    Anesthesia Type: MAC ASA Status: 3          Anesthesia Type: MAC    Vitals  Vitals Value Taken Time   BP     Temp     Pulse 68 03/01/22 1156   Resp     SpO2 97 % 03/01/22 1156   Vitals shown include unvalidated device data.        Post Anesthesia Care and Evaluation    Patient location during evaluation: PHASE II  Patient participation: complete - patient participated  Level of consciousness: awake and alert  Pain management: adequate  Airway patency: patent  Anesthetic complications: No anesthetic complications  PONV Status: none  Cardiovascular status: acceptable  Respiratory status: acceptable  Hydration status: acceptable    Comments: BP 96/66 (BP Location: Left arm, Patient Position: Lying)   Pulse 65   Temp 97.4 °F (36.3 °C) (Temporal)   Resp 13   Ht 188 cm (74\")   Wt 88.9 kg (196 lb)   SpO2 97%   BMI 25.16 kg/m²         "

## 2022-03-01 NOTE — H&P
[facility]  Pre Procedure History & Physical    Chief Complaint:   Garza's, history of polyps    Subjective     HPI:   Your for endoscopy and colonoscopy.  History of polyps.  History of Garza's esophagus    Past Medical History:   Past Medical History:   Diagnosis Date   • Allergic rhinitis    • Arthritis    • Garza's esophagus    • GERD (gastroesophageal reflux disease)    • Hx of colonic polyps    • Hyperlipidemia        Past Surgical History:  Past Surgical History:   Procedure Laterality Date   • COLONOSCOPY W/ POLYPECTOMY  01/19/2015    Tubular adenomatous polyp ascending colon, Polyp at 40 cm insufficient tissue for diagnosis, Diverticulosis repeat exam in 5 years   • ENDOSCOPY  03/23/2015    Intestinal Metaplasia mild to moderate chronic inflammation, HH repeat exam in 3 years   • UPPER GASTROINTESTINAL ENDOSCOPY  12/13/2010    [facility]       Family History:  Family History   Problem Relation Age of Onset   • Colon polyps Brother    • Ulcerative colitis Brother    • Colon cancer Neg Hx        Social History:   reports that he has never smoked. He has never used smokeless tobacco. He reports current alcohol use. He reports that he does not use drugs.    Medications:   Prior to Admission medications    Medication Sig Start Date End Date Taking? Authorizing Provider   digoxin (LANOXIN) 250 MCG tablet TAKE ONE TABLET BY MOUTH ONCE DAILY 2/28/18   Juan Diego Brock MD   finasteride (PROSCAR) 5 MG tablet Take 1 tablet by mouth Daily. 2/25/22   Francisco Porter MD   lisinopril (PRINIVIL,ZESTRIL) 5 MG tablet Take 5 mg by mouth. 12/1/16   Juan Diego Brock MD   pantoprazole (PROTONIX) 40 MG EC tablet Take 40 mg by mouth.    Juan Diego Brock MD   rivaroxaban (XARELTO) 20 MG tablet TAKE ONE TABLET BY MOUTH ONCE DAILY WITH BREAKFAST 2/15/18   Juan Diego Brock MD   tamsulosin (FLOMAX) 0.4 MG capsule 24 hr capsule Take 1 capsule by mouth Daily. 7/16/19   Davin Puri  "MD Christophe   therapeutic multivitamin-minerals (THERAGRAN-M) tablet Take  by mouth.    Provider, MD Juan Diego   polyethylene glycol (GoLYTELY) 236 g solution Take as directed by office instructions. 1/25/22 3/1/22  Bia Bhagat APRN       Allergies:  Statins    Objective     Blood pressure 139/85, pulse 85, temperature 97.4 °F (36.3 °C), temperature source Temporal, resp. rate 20, height 188 cm (74\"), weight 88.9 kg (196 lb), SpO2 95 %.    Physical Exam   Constitutional: Pt is oriented to person, place, and in no distress.   HENT: Mouth/Throat: Oropharynx is clear.   Cardiovascular: Normal rate, regular rhythm.    Pulmonary/Chest: Effort normal. No respiratory distress. No  wheezes.   Abdominal: Soft. Non-distended.  Skin: Skin is warm and dry.   Psychiatric: Mood, memory, affect and judgment appear normal.     Assessment/Plan     Diagnosis:  Garza's, history of polyps    Anticipated Surgical Procedure:    Proceed with endoscopy and colonoscopy as scheduled    The following major R/B/A were discussed with the patient, however the list is not all inclusive . Risk:  Bleeding (immediate and delayed), perforation (rupture or tear), reaction to medication, missed lesion/cancer, pain during the procedure, infection, need for surgery, need for ostomy, need for mechanical ventilation (breathing machine), death.  Benefits: removal of polyp/tissue, burn/clip/or inject to stop bleeding, removal of foreign body, dilate any stricture.  Alternatives: Xray or CT, surgery, do nothing with associated risk   The patient was given time to ask question and received explanation, and agrees to proceed as per History and Physical.   No guarantee given or expressed.    EMR Dragon/transcription disclaimer: Much of this encounter note is an electronic transcription/translation of spoken language to printed text.  The electronic translation of spoken language may permit erroneous, or at times, nonsensical words or phrases to be " inadvertently transcribed.  Although I have reviewed the note for such errors, some may still exist.    Bernardo Swann MD  11:25 CST  3/1/2022

## 2022-03-01 NOTE — ANESTHESIA PREPROCEDURE EVALUATION
Anesthesia Evaluation     Patient summary reviewed   no history of anesthetic complications:  NPO Solid Status: > 8 hours  NPO Liquid Status: > 6 hours           Airway   Mallampati: II  TM distance: >3 FB  Neck ROM: full  Dental      Pulmonary - negative pulmonary ROS   Cardiovascular   Exercise tolerance: good (4-7 METS)    PT is on anticoagulation therapy    (+) hypertension, dysrhythmias Atrial Fib, hyperlipidemia,   (-) pacemaker      Neuro/Psych  (+) dizziness/light headedness,    GI/Hepatic/Renal/Endo    (+)  GERD, PUD,      Musculoskeletal     Abdominal    Substance History      OB/GYN          Other   arthritis,                        Anesthesia Plan    ASA 3     MAC   (Blood thinner held )  intravenous induction     Anesthetic plan, all risks, benefits, and alternatives have been provided, discussed and informed consent has been obtained with: patient and spouse/significant other.

## 2022-03-06 LAB
CYTO UR: NORMAL
LAB AP CASE REPORT: NORMAL
PATH REPORT.FINAL DX SPEC: NORMAL
PATH REPORT.GROSS SPEC: NORMAL

## 2022-04-25 ENCOUNTER — APPOINTMENT (RX ONLY)
Dept: URBAN - METROPOLITAN AREA CLINIC 169 | Facility: CLINIC | Age: 72
Setting detail: DERMATOLOGY
End: 2022-04-25

## 2022-04-25 DIAGNOSIS — L82.1 OTHER SEBORRHEIC KERATOSIS: ICD-10-CM | Status: STABLE

## 2022-04-25 DIAGNOSIS — D18.0 HEMANGIOMA: ICD-10-CM | Status: STABLE

## 2022-04-25 DIAGNOSIS — L81.4 OTHER MELANIN HYPERPIGMENTATION: ICD-10-CM | Status: STABLE

## 2022-04-25 DIAGNOSIS — Z85.828 PERSONAL HISTORY OF OTHER MALIGNANT NEOPLASM OF SKIN: ICD-10-CM | Status: STABLE

## 2022-04-25 DIAGNOSIS — D22 MELANOCYTIC NEVI: ICD-10-CM | Status: STABLE

## 2022-04-25 PROBLEM — D18.01 HEMANGIOMA OF SKIN AND SUBCUTANEOUS TISSUE: Status: ACTIVE | Noted: 2022-04-25

## 2022-04-25 PROBLEM — D22.5 MELANOCYTIC NEVI OF TRUNK: Status: ACTIVE | Noted: 2022-04-25

## 2022-04-25 PROCEDURE — ? SUNSCREEN RECOMMENDATIONS

## 2022-04-25 PROCEDURE — 99213 OFFICE O/P EST LOW 20 MIN: CPT

## 2022-04-25 PROCEDURE — ? ADDITIONAL NOTES

## 2022-04-25 PROCEDURE — ? COUNSELING

## 2022-04-25 ASSESSMENT — LOCATION SIMPLE DESCRIPTION DERM
LOCATION SIMPLE: UPPER BACK
LOCATION SIMPLE: LEFT UPPER BACK
LOCATION SIMPLE: LOWER BACK

## 2022-04-25 ASSESSMENT — LOCATION ZONE DERM: LOCATION ZONE: TRUNK

## 2022-04-25 ASSESSMENT — LOCATION DETAILED DESCRIPTION DERM
LOCATION DETAILED: INFERIOR THORACIC SPINE
LOCATION DETAILED: LEFT INFERIOR MEDIAL UPPER BACK
LOCATION DETAILED: SUPERIOR LUMBAR SPINE

## 2022-07-12 ENCOUNTER — OFFICE VISIT (OUTPATIENT)
Dept: CARDIOLOGY CLINIC | Age: 72
End: 2022-07-12
Payer: COMMERCIAL

## 2022-07-12 VITALS
BODY MASS INDEX: 26.05 KG/M2 | DIASTOLIC BLOOD PRESSURE: 88 MMHG | SYSTOLIC BLOOD PRESSURE: 132 MMHG | HEIGHT: 74 IN | HEART RATE: 79 BPM | WEIGHT: 203 LBS

## 2022-07-12 DIAGNOSIS — I48.0 PAF (PAROXYSMAL ATRIAL FIBRILLATION) (HCC): Primary | ICD-10-CM

## 2022-07-12 PROCEDURE — 99212 OFFICE O/P EST SF 10 MIN: CPT | Performed by: INTERNAL MEDICINE

## 2022-07-12 PROCEDURE — 1123F ACP DISCUSS/DSCN MKR DOCD: CPT | Performed by: INTERNAL MEDICINE

## 2022-07-12 PROCEDURE — 93000 ELECTROCARDIOGRAM COMPLETE: CPT | Performed by: INTERNAL MEDICINE

## 2022-07-12 NOTE — PROGRESS NOTES
HISTORY  77-year-old gentleman with a history of combined hyperlipidemia, hypertension, statin allergy/myopathy, and paroxysmal atrial fibrillation returns for routine follow-up. First diagnosed with atrial fibrillation in 2015 he has been managed with anticoagulant coverage in the form of Xarelto subsequently. 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 On return today he has no new cardiac complaints. Unfortunately he required cardiac clearance for an endoscopy. He tells me he called the office 3 times without a response. He subsequently retrieved the clearance sheet and brought to our office and handed it to one of the personnel. He went for his endoscopy but clearance had not been received and it was canceled. He is switching his care to [address] [place] [address]  On exam carries 203 pounds a 6 foot 2 inch frame. Pressure is 132/88 pulse of 79. Normal male balding pattern. ASSESSMENT/PLAN:   1. Cardiac care -inadequate communication.

## 2022-08-09 NOTE — PROGRESS NOTES
Subjective    Mr. Christina is 72 y.o. male    Chief Complaint: Elevated PSA    History of Present Illness    72-year-old male established patient on chronic anticoagulation with Xarelto follow-up for history of elevated PSA.  He was placed on finasteride approximately 6-months ago and his PSA has come back down to 2.25 from 5.1 as expected.  He denies bothersome LUTS, hematuria, or flank pain.    Lab Results   Component Value Date    PSA 2.250 08/10/2022    PSA 5.100 (H) 02/16/2022    PSA 3.790 07/10/2019       The following portions of the patient's history were reviewed and updated as appropriate: allergies, current medications, past family history, past medical history, past social history, past surgical history and problem list.    Review of Systems      Current Outpatient Medications:   •  digoxin (LANOXIN) 250 MCG tablet, TAKE ONE TABLET BY MOUTH ONCE DAILY, Disp: , Rfl:   •  finasteride (PROSCAR) 5 MG tablet, Take 1 tablet by mouth Daily., Disp: 90 tablet, Rfl: 3  •  lisinopril (PRINIVIL,ZESTRIL) 5 MG tablet, Take 5 mg by mouth., Disp: , Rfl:   •  pantoprazole (PROTONIX) 40 MG EC tablet, Take 40 mg by mouth., Disp: , Rfl:   •  rivaroxaban (XARELTO) 20 MG tablet, TAKE ONE TABLET BY MOUTH ONCE DAILY WITH BREAKFAST, Disp: , Rfl:   •  tamsulosin (FLOMAX) 0.4 MG capsule 24 hr capsule, Take 1 capsule by mouth Daily., Disp: 30 capsule, Rfl: 11  •  therapeutic multivitamin-minerals (THERAGRAN-M) tablet, Take  by mouth., Disp: , Rfl:     Past Medical History:   Diagnosis Date   • Allergic rhinitis    • Arthritis    • Garza's esophagus    • GERD (gastroesophageal reflux disease)    • Hx of colonic polyps    • Hyperlipidemia        Past Surgical History:   Procedure Laterality Date   • COLONOSCOPY N/A 3/1/2022    Procedure: COLONOSCOPY WITH ANESTHESIA;  Surgeon: Bernardo Swann MD;  Location: [facility] ENDOSCOPY;  Service: Gastroenterology;  Laterality: N/A;  pre screen  post polyps; diverticulosis     •  "COLONOSCOPY W/ POLYPECTOMY  01/19/2015    Tubular adenomatous polyp ascending colon, Polyp at 40 cm insufficient tissue for diagnosis, Diverticulosis repeat exam in 5 years   • ENDOSCOPY  03/23/2015    Intestinal Metaplasia mild to moderate chronic inflammation, HH repeat exam in 3 years   • ENDOSCOPY N/A 3/1/2022    Procedure: ESOPHAGOGASTRODUODENOSCOPY WITH ANESTHESIA;  Surgeon: Bernardo Swann MD;  Location: [facility] ENDOSCOPY;  Service: Gastroenterology;  Laterality: N/A;  pre barretts  post same  Dr.J. Valdez   • UPPER GASTROINTESTINAL ENDOSCOPY  12/13/2010    [facility]       Social History     Socioeconomic History   • Marital status:    Tobacco Use   • Smoking status: Never Smoker   • Smokeless tobacco: Never Used   Vaping Use   • Vaping Use: Never used   Substance and Sexual Activity   • Alcohol use: Yes     Comment: Moderate   • Drug use: No   • Sexual activity: Defer       Family History   Problem Relation Age of Onset   • Colon polyps Brother    • Ulcerative colitis Brother    • Colon cancer Neg Hx        Objective    Temp 97.3 °F (36.3 °C)   Ht 188 cm (74\")   Wt 95.9 kg (211 lb 6.4 oz)   BMI 27.14 kg/m²     Physical Exam        Results for orders placed or performed in visit on 08/15/22   POC Urinalysis Dipstick, Multipro    Specimen: Urine   Result Value Ref Range    Color Yellow Yellow, Straw, Dark Yellow, Deann    Clarity, UA Clear Clear    Glucose, UA Negative Negative mg/dL    Bilirubin Negative Negative    Ketones, UA 15 mg/dL (A) Negative    Specific Gravity  1.025 1.005 - 1.030    Blood, UA Negative Negative    pH, Urine 6.5 5.0 - 8.0    Protein, POC Negative Negative mg/dL    Urobilinogen, UA 1 E.U./dL  (A) Normal    Nitrite, UA Negative Negative    Leukocytes Negative Negative     Assessment and Plan    Diagnoses and all orders for this visit:    1. Elevated prostate specific antigen (PSA) (Primary)  -     POC Urinalysis Dipstick, Multipro    2. Benign prostatic hyperplasia without " lower urinary tract symptoms      Stable PSA commensurate for his age reduced by appropriate 50% on finasteride.  I recommended no further PSA testing given his age per AUA guidelines to which he concurs.  Continue finasteride and tamsulosin.  He will follow-up with me in 1 year or sooner as needed.      This document has been signed by KIANNA Porter MD on August 15, 2022 17:27 CDT

## 2022-08-15 ENCOUNTER — OFFICE VISIT (OUTPATIENT)
Dept: UROLOGY | Facility: CLINIC | Age: 72
End: 2022-08-15

## 2022-08-15 VITALS — TEMPERATURE: 97.3 F | BODY MASS INDEX: 27.13 KG/M2 | WEIGHT: 211.4 LBS | HEIGHT: 74 IN

## 2022-08-15 DIAGNOSIS — N40.0 BENIGN PROSTATIC HYPERPLASIA WITHOUT LOWER URINARY TRACT SYMPTOMS: ICD-10-CM

## 2022-08-15 DIAGNOSIS — R97.20 ELEVATED PROSTATE SPECIFIC ANTIGEN (PSA): Primary | ICD-10-CM

## 2022-08-15 LAB
BILIRUB BLD-MCNC: NEGATIVE MG/DL
CLARITY, POC: CLEAR
COLOR UR: YELLOW
GLUCOSE UR STRIP-MCNC: NEGATIVE MG/DL
KETONES UR QL: ABNORMAL
LEUKOCYTE EST, POC: NEGATIVE
NITRITE UR-MCNC: NEGATIVE MG/ML
PH UR: 6.5 [PH] (ref 5–8)
PROT UR STRIP-MCNC: NEGATIVE MG/DL
RBC # UR STRIP: NEGATIVE /UL
SP GR UR: 1.02 (ref 1–1.03)
UROBILINOGEN UR QL: ABNORMAL

## 2022-08-15 PROCEDURE — 81001 URINALYSIS AUTO W/SCOPE: CPT | Performed by: UROLOGY

## 2022-08-15 PROCEDURE — 99213 OFFICE O/P EST LOW 20 MIN: CPT | Performed by: UROLOGY

## 2022-11-09 ENCOUNTER — APPOINTMENT (RX ONLY)
Dept: URBAN - METROPOLITAN AREA CLINIC 169 | Facility: CLINIC | Age: 72
Setting detail: DERMATOLOGY
End: 2022-11-09

## 2022-11-09 DIAGNOSIS — Z85.828 PERSONAL HISTORY OF OTHER MALIGNANT NEOPLASM OF SKIN: ICD-10-CM | Status: STABLE

## 2022-11-09 DIAGNOSIS — L82.1 OTHER SEBORRHEIC KERATOSIS: ICD-10-CM | Status: STABLE

## 2022-11-09 DIAGNOSIS — L81.4 OTHER MELANIN HYPERPIGMENTATION: ICD-10-CM | Status: STABLE

## 2022-11-09 DIAGNOSIS — D22 MELANOCYTIC NEVI: ICD-10-CM | Status: STABLE

## 2022-11-09 DIAGNOSIS — D18.0 HEMANGIOMA: ICD-10-CM | Status: STABLE

## 2022-11-09 PROBLEM — D18.01 HEMANGIOMA OF SKIN AND SUBCUTANEOUS TISSUE: Status: ACTIVE | Noted: 2022-11-09

## 2022-11-09 PROBLEM — D22.5 MELANOCYTIC NEVI OF TRUNK: Status: ACTIVE | Noted: 2022-11-09

## 2022-11-09 PROCEDURE — ? ADDITIONAL NOTES

## 2022-11-09 PROCEDURE — ? COUNSELING

## 2022-11-09 PROCEDURE — 99213 OFFICE O/P EST LOW 20 MIN: CPT

## 2022-11-09 PROCEDURE — ? SUNSCREEN RECOMMENDATIONS

## 2022-11-09 ASSESSMENT — LOCATION ZONE DERM: LOCATION ZONE: TRUNK

## 2022-11-09 ASSESSMENT — LOCATION SIMPLE DESCRIPTION DERM
LOCATION SIMPLE: LOWER BACK
LOCATION SIMPLE: UPPER BACK
LOCATION SIMPLE: LEFT UPPER BACK

## 2022-11-09 ASSESSMENT — LOCATION DETAILED DESCRIPTION DERM
LOCATION DETAILED: SUPERIOR LUMBAR SPINE
LOCATION DETAILED: LEFT INFERIOR MEDIAL UPPER BACK
LOCATION DETAILED: INFERIOR THORACIC SPINE

## 2022-12-15 ENCOUNTER — OFFICE VISIT (OUTPATIENT)
Dept: CARDIOLOGY | Facility: CLINIC | Age: 72
End: 2022-12-15

## 2022-12-15 VITALS
OXYGEN SATURATION: 98 % | DIASTOLIC BLOOD PRESSURE: 76 MMHG | HEIGHT: 74 IN | WEIGHT: 197 LBS | HEART RATE: 74 BPM | SYSTOLIC BLOOD PRESSURE: 142 MMHG | BODY MASS INDEX: 25.28 KG/M2

## 2022-12-15 DIAGNOSIS — I10 PRIMARY HYPERTENSION: ICD-10-CM

## 2022-12-15 DIAGNOSIS — I48.0 PAF (PAROXYSMAL ATRIAL FIBRILLATION): Primary | ICD-10-CM

## 2022-12-15 DIAGNOSIS — Z79.01 CHRONIC ANTICOAGULATION: ICD-10-CM

## 2022-12-15 PROBLEM — E78.5 HYPERLIPIDEMIA: Status: ACTIVE | Noted: 2022-12-15

## 2022-12-15 PROCEDURE — 93000 ELECTROCARDIOGRAM COMPLETE: CPT | Performed by: INTERNAL MEDICINE

## 2022-12-15 PROCEDURE — 99204 OFFICE O/P NEW MOD 45 MIN: CPT | Performed by: INTERNAL MEDICINE

## 2022-12-15 NOTE — PROGRESS NOTES
[facility] - CARDIOLOGY  New Patient Initial Outpatient Evaluation    Primary Care Physician: Bia Valdez DO    Subjective     Chief Complaint: Transfer of care for atrial fibrillation    History of Present Illness    Mr. Christina is kindly referred to me to assume management of his atrial fibrillation by Dr. Bia Valdez.    Mr. Christina comes to the clinic today stating that he is doing well. He reports that he was diagnosed with atrial fibrillation in 2015 by Dr. Chacon. He states that he was put on digoxin. The patient reports that he has not had any problems since then. He reports that he had most recently been seeing Dr. William, but decided he wanted to transfer his care elsewhere in 07/2022. He states that he did not bring in his medication list, and does not remember what all medications he takes. He reports that he takes Xarelto after food. The patient denies experiencing palpitations, chest discomfort, or shortness of breath. He denies a history of cardioversion. He has been seen by Dr. Rucker, Dr. Chacon, and Dr. William over the years.     He notes he has had COVID-19 twice.    Review of Systems   Constitutional: Negative for malaise/fatigue.   Cardiovascular: Negative for chest pain, claudication, dyspnea on exertion, leg swelling, near-syncope, orthopnea, palpitations, paroxysmal nocturnal dyspnea and syncope.   Respiratory: Negative for shortness of breath.    Hematologic/Lymphatic: Does not bruise/bleed easily.        Otherwise complete ROS reviewed and negative except as mentioned in the HPI.      Past Medical History:   Past Medical History:   Diagnosis Date   • Abnormal ECG    • Allergic rhinitis    • Arrhythmia    • Arthritis    • Atrial fibrillation (HCC)    • Garza's esophagus    • GERD (gastroesophageal reflux disease)    • Hx of colonic polyps    • Hyperlipidemia    • Hypertension        Past Surgical History:  Past Surgical History:   Procedure Laterality Date   • COLONOSCOPY N/A  03/01/2022    Procedure: COLONOSCOPY WITH ANESTHESIA;  Surgeon: Bernardo Swann MD;  Location:  [facility] ENDOSCOPY;  Service: Gastroenterology;  Laterality: N/A;  pre screen  post polyps; diverticulosis     • COLONOSCOPY W/ POLYPECTOMY  01/19/2015    Tubular adenomatous polyp ascending colon, Polyp at 40 cm insufficient tissue for diagnosis, Diverticulosis repeat exam in 5 years   • ENDOSCOPY  03/23/2015    Intestinal Metaplasia mild to moderate chronic inflammation, HH repeat exam in 3 years   • ENDOSCOPY N/A 03/01/2022    Procedure: ESOPHAGOGASTRODUODENOSCOPY WITH ANESTHESIA;  Surgeon: Bernardo Swann MD;  Location: [facility] ENDOSCOPY;  Service: Gastroenterology;  Laterality: N/A;  pre barretts  post same  Dr.J. Valdez   • UPPER GASTROINTESTINAL ENDOSCOPY  12/13/2010    [facility]       Family History: family history includes Colon polyps in his brother; Ulcerative colitis in his brother.    Social History:  reports that he has never smoked. He has never used smokeless tobacco. He reports current alcohol use of about 14.0 standard drinks per week. He reports that he does not use drugs.    Medications:  Prior to Admission medications    Medication Sig Start Date End Date Taking? Authorizing Provider   finasteride (PROSCAR) 5 MG tablet Take 1 tablet by mouth Daily. 2/25/22  Yes Francisco Porter MD   lisinopril (PRINIVIL,ZESTRIL) 5 MG tablet Take 5 mg by mouth. 12/1/16  Yes ProviderJuan Diego MD   pantoprazole (PROTONIX) 40 MG EC tablet Take 40 mg by mouth.   Yes Provider, MD Juan Diego   rivaroxaban (XARELTO) 20 MG tablet TAKE ONE TABLET BY MOUTH ONCE DAILY WITH BREAKFAST 2/15/18  Yes Provider, MD Juan Diego   tamsulosin (FLOMAX) 0.4 MG capsule 24 hr capsule Take 1 capsule by mouth Daily. 7/16/19  Yes Davin Puri MD   therapeutic multivitamin-minerals (THERAGRAN-M) tablet Take  by mouth.   Yes ProviderJuan Diego MD   digoxin (LANOXIN) 250 MCG tablet TAKE ONE TABLET BY MOUTH ONCE DAILY  "2/28/18 12/15/22 Yes Provider, MD Juan Diego     Allergies:  Allergies   Allergen Reactions   • Statins Other (See Comments)     Muscle weakness bilat lower legs  Dr William reported 'severe myopathy'       Objective     Vital Signs: /76   Pulse 74   Ht 188 cm (74\")   Wt 89.4 kg (197 lb)   SpO2 98%   BMI 25.29 kg/m²     Vitals and nursing note reviewed.   Constitutional:       General: Not in acute distress.     Appearance: Not in distress.   Neck:      Vascular: No JVD or JVR. JVD normal.   Pulmonary:      Effort: Pulmonary effort is normal.      Breath sounds: Normal breath sounds.   Cardiovascular:      Normal rate. Regular rhythm.      Murmurs: There is no murmur.      No gallop. No rub.   Pulses:     Intact distal pulses.   Edema:     Peripheral edema absent.   Skin:     General: Skin is warm and dry.   Neurological:      Mental Status: Alert, oriented to person, place, and time and oriented to person, place and time.         Results Reviewed:      ECG 12 Lead    Date/Time: 12/15/2022 8:59 AM  Performed by: Gianni Krause MD  Authorized by: Gianni Krause MD   Comparison: compared with previous ECG from 1/19/2015  Comparison to previous ECG: Normal sinus rhythm has replaced atrial fibrillation, and incomplete right bundle branch block is now present.  Rhythm: sinus rhythm  Conduction: incomplete right bundle branch block and 1st degree AV block    Clinical impression: abnormal EKG              Lab Results   Component Value Date    TRIG 85 12/03/2020    HDL 56 12/03/2020     No results found for: HGBA1C        Assessment / Plan        Problem List Items Addressed This Visit        Cardiac and Vasculature    PAF (paroxysmal atrial fibrillation) (HCC) - Primary    HTN (hypertension)       Coag and Thromboembolic    Chronic anticoagulation    Overview     Formatting of this note might be different from the original.  Xarelto            Recommendations/plans:  1. Paroxysmal atrial fibrillation: Stable, " in sinus rhythm today.  Reports no symptoms with original diagnosis, nor any documented recurrences since.  -CHADS2-VASc=2.   Appropriately on Xarelto, but has not been taking it after eating, so I did advise him to do so.  - Given the above, and the fact that his level has never been checked, I did advise that he stop taking digoxin now.   - I also advised that he call us should he develop palpitations or new onset fatigue, exertional dyspnea, or any other symptom that might be reflective of a recurrence of atrial fibrillation.    2. Chronic anticoagulation: Doing well without bleeding concerns on Xarelto. Advised to take it after eating as per above.    3. Essential hypertension: Well-controlled. Continue lisinopril at current dose.    Follow up in 6 months or sooner with any new or worsening symptoms.    Transcribed from ambient dictation for Gianni Krause MD by Isabel Hartman.  12/15/22   13:19 CST    Patient or patient representative verbalized consent to the visit recording.   I Gianni Krause MD have personally performed the services described in this document as scribed by the above individual, and it is both accurate and complete.   I have edited each component as needed.    Gianni Krause MD  12/15/2022  20:17 CST

## 2023-01-23 PROCEDURE — 87076 CULTURE ANAEROBE IDENT EACH: CPT | Performed by: FAMILY MEDICINE

## 2023-01-23 PROCEDURE — 87205 SMEAR GRAM STAIN: CPT | Performed by: FAMILY MEDICINE

## 2023-01-23 PROCEDURE — 87070 CULTURE OTHR SPECIMN AEROBIC: CPT | Performed by: FAMILY MEDICINE

## 2023-01-27 ENCOUNTER — TELEPHONE (OUTPATIENT)
Dept: URGENT CARE | Facility: CLINIC | Age: 73
End: 2023-01-27
Payer: MEDICARE

## 2023-01-27 NOTE — TELEPHONE ENCOUNTER
Pt called back to say he has not been taking the bactrim that was written on the day of the visit. Discussed with MIKEY CHEN in Dr Medina absence should we call in the amoxicillin since pt has not started the Bactrim. MIKEY CHEN agreed and Rx was called into [facility]. Pt is aware to  the amoxicillin today and start it not the Bactrim that was called in on the 23rd.

## 2023-02-08 ENCOUNTER — OFFICE VISIT (OUTPATIENT)
Dept: SURGERY | Facility: CLINIC | Age: 73
End: 2023-02-08
Payer: MEDICARE

## 2023-02-08 VITALS
BODY MASS INDEX: 26.65 KG/M2 | OXYGEN SATURATION: 99 % | TEMPERATURE: 98 F | HEART RATE: 81 BPM | WEIGHT: 207.67 LBS | HEIGHT: 74 IN | SYSTOLIC BLOOD PRESSURE: 149 MMHG | DIASTOLIC BLOOD PRESSURE: 89 MMHG

## 2023-02-08 DIAGNOSIS — L72.3 SEBACEOUS CYST: Primary | ICD-10-CM

## 2023-02-08 PROCEDURE — 99203 OFFICE O/P NEW LOW 30 MIN: CPT | Performed by: SPECIALIST

## 2023-02-09 NOTE — PROGRESS NOTES
Kalyani Arguelles MD FACS History and Physical     Referring Provider: No ref. provider found      Chief complaint   Chief Complaint   Patient presents with   • Cyst     Mr. Christina is here for a consult for a sebaceous cyst.        Subjective .     History of present illness:  Bobby Christina is a 72 y.o. male who presents complaining of a sebaceous cyst on the back.  He has a history of excision several years ago and it has returned.  He states that recently it had increased in size and pain.  It was erythematous and draining.  He underwent mike evacuation and placement of oral antibiotics.  He now states that the pain and discharge have resolved.  .    History  Past Medical History:   Diagnosis Date   • Abnormal ECG    • Allergic rhinitis    • Arrhythmia    • Arthritis    • Atrial fibrillation (HCC)    • Garza's esophagus    • GERD (gastroesophageal reflux disease)    • Hx of colonic polyps    • Hyperlipidemia    • Hypertension    ,   Past Surgical History:   Procedure Laterality Date   • COLONOSCOPY N/A 03/01/2022    Procedure: COLONOSCOPY WITH ANESTHESIA;  Surgeon: Bernardo Swann MD;  Location: [facility] ENDOSCOPY;  Service: Gastroenterology;  Laterality: N/A;  pre screen  post polyps; diverticulosis     • COLONOSCOPY W/ POLYPECTOMY  01/19/2015    Tubular adenomatous polyp ascending colon, Polyp at 40 cm insufficient tissue for diagnosis, Diverticulosis repeat exam in 5 years   • ENDOSCOPY  03/23/2015    Intestinal Metaplasia mild to moderate chronic inflammation, HH repeat exam in 3 years   • ENDOSCOPY N/A 03/01/2022    Procedure: ESOPHAGOGASTRODUODENOSCOPY WITH ANESTHESIA;  Surgeon: Bernardo Swann MD;  Location: [facility] ENDOSCOPY;  Service: Gastroenterology;  Laterality: N/A;  pre barretts  post same  Dr.J. Valdez   • UPPER GASTROINTESTINAL ENDOSCOPY  12/13/2010    [facility]   ,   Family History   Problem Relation Age of Onset   • Colon polyps Brother    • Ulcerative colitis Brother    • Colon  "cancer Neg Hx    ,   Social History     Tobacco Use   • Smoking status: Never   • Smokeless tobacco: Never   Vaping Use   • Vaping Use: Never used   Substance Use Topics   • Alcohol use: Yes     Alcohol/week: 14.0 standard drinks     Types: 14 Glasses of wine per week   • Drug use: No   , (Not in a hospital admission)   and Allergies:  Statins    Current Outpatient Medications:   •  finasteride (PROSCAR) 5 MG tablet, Take 1 tablet by mouth Daily., Disp: 90 tablet, Rfl: 3  •  pantoprazole (PROTONIX) 40 MG EC tablet, Take 40 mg by mouth., Disp: , Rfl:   •  rivaroxaban (XARELTO) 20 MG tablet, TAKE ONE TABLET BY MOUTH ONCE DAILY WITH BREAKFAST, Disp: , Rfl:   •  tamsulosin (FLOMAX) 0.4 MG capsule 24 hr capsule, Take 1 capsule by mouth Daily., Disp: 30 capsule, Rfl: 11  •  therapeutic multivitamin-minerals (THERAGRAN-M) tablet, Take  by mouth., Disp: , Rfl:     Review of Systems:    All organ systems were evaluated and found negative except those which are mentioned in the History of Present Illness.      Objective     Physical Exam:    Vital Signs   /89 (BP Location: Left arm, Patient Position: Sitting, Cuff Size: Adult)   Pulse 81   Temp 98 °F (36.7 °C)   Ht 188 cm (74.02\")   Wt 94.2 kg (207 lb 10.8 oz)   SpO2 99%   BMI 26.65 kg/m²        Constitutional:    Well-developed, well-nourished in no acute distress  Eyes:     Extraocular movements intact; pupils equal, round, and reactive  Ears, Nose, Mouth, Throat:  Hearing intact, nose midline, no mucosal lesions  Cardiovascular:   Regular rate and rhythm   Respiratory:    Clear to auscultation bilaterally  Gastrointestinal:   Soft, nontender, nondistended, no masses, bowel sounds intact  Genitourinary:    Deferred  Musculoskeletal:   Full range of motion, no muscle wasting, no weakness  Skin:     No rashes or excoriations  Neurological:    Moves all extremities, sensation intact  Psychiatric:    Alert and oriented to person, place, and " time   Hematologic/Lymphatic/Immune: No lymphadenopathy    Back     3cm area of purple discoloration.  No expressible discharge.      Results Review:    BMI is >= 25 and <30. (Overweight) The following options were offered after discussion;: referral to primary care    Assessment & Plan       Diagnoses and all orders for this visit:    1. Sebaceous cyst (Primary)         The patient has a recurrent sebaceous cyst.  He would like definitive excision; however, he needs to leave town as a family member has just been placed on hospice.  He was instructed to finish his prescribed oral antibiotics and to notify the office when he is back in town to schedule the excision.  An extensive review of patient intake forms, referring physician documents, laboratories, and imaging was performed in the medical decision making and surgical planning of this patient.            Kalyani Arguelles MD

## 2023-04-27 ENCOUNTER — APPOINTMENT (RX ONLY)
Dept: URBAN - METROPOLITAN AREA CLINIC 169 | Facility: CLINIC | Age: 73
Setting detail: DERMATOLOGY
End: 2023-04-27

## 2023-04-27 DIAGNOSIS — D18.0 HEMANGIOMA: ICD-10-CM | Status: STABLE

## 2023-04-27 DIAGNOSIS — L57.0 ACTINIC KERATOSIS: ICD-10-CM | Status: WORSENING

## 2023-04-27 DIAGNOSIS — L81.4 OTHER MELANIN HYPERPIGMENTATION: ICD-10-CM | Status: STABLE

## 2023-04-27 DIAGNOSIS — D22 MELANOCYTIC NEVI: ICD-10-CM | Status: STABLE

## 2023-04-27 DIAGNOSIS — Z85.828 PERSONAL HISTORY OF OTHER MALIGNANT NEOPLASM OF SKIN: ICD-10-CM | Status: STABLE

## 2023-04-27 DIAGNOSIS — L82.1 OTHER SEBORRHEIC KERATOSIS: ICD-10-CM | Status: STABLE

## 2023-04-27 PROBLEM — D22.5 MELANOCYTIC NEVI OF TRUNK: Status: ACTIVE | Noted: 2023-04-27

## 2023-04-27 PROBLEM — D18.01 HEMANGIOMA OF SKIN AND SUBCUTANEOUS TISSUE: Status: ACTIVE | Noted: 2023-04-27

## 2023-04-27 PROCEDURE — 99213 OFFICE O/P EST LOW 20 MIN: CPT | Mod: 25

## 2023-04-27 PROCEDURE — ? COUNSELING

## 2023-04-27 PROCEDURE — ? FULL BODY SKIN EXAM

## 2023-04-27 PROCEDURE — ? SUNSCREEN RECOMMENDATIONS

## 2023-04-27 PROCEDURE — ? LIQUID NITROGEN

## 2023-04-27 PROCEDURE — ? MEDICARE ABN

## 2023-04-27 PROCEDURE — ? ADDITIONAL NOTES

## 2023-04-27 PROCEDURE — 17000 DESTRUCT PREMALG LESION: CPT

## 2023-04-27 ASSESSMENT — LOCATION SIMPLE DESCRIPTION DERM
LOCATION SIMPLE: LEFT UPPER BACK
LOCATION SIMPLE: LEFT CHEEK
LOCATION SIMPLE: LOWER BACK
LOCATION SIMPLE: UPPER BACK

## 2023-04-27 ASSESSMENT — LOCATION DETAILED DESCRIPTION DERM
LOCATION DETAILED: LEFT CENTRAL MALAR CHEEK
LOCATION DETAILED: LEFT INFERIOR MEDIAL UPPER BACK
LOCATION DETAILED: SUPERIOR LUMBAR SPINE
LOCATION DETAILED: INFERIOR THORACIC SPINE

## 2023-04-27 ASSESSMENT — LOCATION ZONE DERM
LOCATION ZONE: FACE
LOCATION ZONE: TRUNK

## 2023-04-27 NOTE — PROCEDURE: MEDICARE ABN
Cost Of Treatment Patient Responsible For Paying?: 200
Reason?: Additional Information
Procedure (Limit To 20 Characters): ln2
Detail Level: Detailed
Payment Option: Option 1: Bill Medicare, await for decision on payment.
Reason?: Possible non-covered service

## 2023-04-27 NOTE — PROCEDURE: LIQUID NITROGEN
Render Post-Care Instructions In Note?: no
Duration Of Freeze Thaw-Cycle (Seconds): 0
Consent: The patient's consent was obtained including but not limited to risks of crusting, scabbing, blistering, scarring, darker or lighter pigmentary change, recurrence, incomplete removal and infection.
Detail Level: Detailed
Show Aperture Variable?: Yes
Post-Care Instructions: I reviewed with the patient in detail post-care instructions. Patient is to wear sunprotection, and avoid picking at any of the treated lesions. Pt may apply Vaseline to crusted or scabbing areas.

## 2023-07-24 ENCOUNTER — OFFICE VISIT (OUTPATIENT)
Dept: CARDIOLOGY | Facility: CLINIC | Age: 73
End: 2023-07-24
Payer: MEDICARE

## 2023-07-24 VITALS
OXYGEN SATURATION: 97 % | SYSTOLIC BLOOD PRESSURE: 138 MMHG | HEIGHT: 74 IN | BODY MASS INDEX: 26.05 KG/M2 | HEART RATE: 75 BPM | WEIGHT: 203 LBS | DIASTOLIC BLOOD PRESSURE: 84 MMHG

## 2023-07-24 DIAGNOSIS — I10 PRIMARY HYPERTENSION: ICD-10-CM

## 2023-07-24 DIAGNOSIS — I48.0 PAF (PAROXYSMAL ATRIAL FIBRILLATION): Primary | ICD-10-CM

## 2023-07-24 PROCEDURE — 1160F RVW MEDS BY RX/DR IN RCRD: CPT | Performed by: NURSE PRACTITIONER

## 2023-07-24 PROCEDURE — 3079F DIAST BP 80-89 MM HG: CPT | Performed by: NURSE PRACTITIONER

## 2023-07-24 PROCEDURE — 1159F MED LIST DOCD IN RCRD: CPT | Performed by: NURSE PRACTITIONER

## 2023-07-24 PROCEDURE — 93000 ELECTROCARDIOGRAM COMPLETE: CPT | Performed by: NURSE PRACTITIONER

## 2023-07-24 PROCEDURE — 3075F SYST BP GE 130 - 139MM HG: CPT | Performed by: NURSE PRACTITIONER

## 2023-07-24 PROCEDURE — 99214 OFFICE O/P EST MOD 30 MIN: CPT | Performed by: NURSE PRACTITIONER

## 2023-07-24 NOTE — PROGRESS NOTES
Subjective:     Encounter Date:07/24/2023      Patient ID: Bobby Christina is a 73 y.o. male.    Chief Complaint: follow up atrial fibrillation     History of Present Illness    The patient presents today to follow-up regarding his atrial fibrillation.  He established care with Dr. Krause in December 2022.    He was doing well at that initial visit.  He was diagnosed with atrial fibrillation in 2015 by Dr. Chacon and placed on digoxin.  He denied having any problems since that time.  He denied any prior cardioversions.  He had also been seen by Dr. Goins and Dr. William in the past.  At that visit he was on digoxin 250 mcg daily, lisinopril 5 mg daily and Xarelto 20 mg daily.  He was instructed to take the Xarelto with food/after his largest meal of the day.    Today the patient presents for follow-up and is doing well.  He is without complaint.  He denies any chest discomfort, shortness of breath or palpitations.  He does report he does not like to take pills and for this reason several weeks to months ago he quit taking lisinopril, digoxin and Xarelto without any subsequent development of symptoms.  Since that time he has seen his primary care physician who has spoken with him about the importance of compliance with Xarelto for stroke prevention and he is now back on this medication.  He remains off of lisinopril and digoxin.  He did not develop any palpitations after stopping digoxin.  He denies any bleeding problems.  He reports his blood pressure is well controlled without lisinopril.       The following portions of the patient's history were reviewed and updated as appropriate: allergies, current medications, past family history, past medical history, past social history, past surgical history and problem list.    Review of Systems   Constitutional: Negative for malaise/fatigue.   Cardiovascular:  Negative for chest pain, claudication, dyspnea on exertion, leg swelling, near-syncope, orthopnea, palpitations,  "paroxysmal nocturnal dyspnea and syncope.   Respiratory:  Negative for cough and shortness of breath.    Hematologic/Lymphatic: Does not bruise/bleed easily.   Musculoskeletal:  Negative for falls.   Gastrointestinal:  Negative for bloating.   Neurological:  Negative for dizziness, light-headedness and weakness.     Allergies   Allergen Reactions    Statins Other (See Comments)     Muscle weakness bilat lower legs  Dr William reported 'severe myopathy'       Current Outpatient Medications:     therapeutic multivitamin-minerals (THERAGRAN-M) tablet, Take  by mouth., Disp: , Rfl:     rivaroxaban (XARELTO) 20 MG tablet, Take 1 tablet by mouth Daily With Lunch., Disp: 90 tablet, Rfl: 3         Objective:    /84   Pulse 75   Ht 188 cm (74.02\")   Wt 92.1 kg (203 lb)   SpO2 97%   BMI 26.05 kg/m²        Vitals and nursing note reviewed.   Constitutional:       General: Not in acute distress.     Appearance: Well-developed and not in distress. Not diaphoretic.   Neck:      Vascular: No JVD.   Pulmonary:      Effort: Pulmonary effort is normal. No respiratory distress.      Breath sounds: Normal breath sounds.   Cardiovascular:      Normal rate. Regular rhythm.      Murmurs: There is no murmur.   Edema:     Peripheral edema absent.   Abdominal:      Tenderness: There is no abdominal tenderness.   Skin:     General: Skin is warm and dry.   Neurological:      Mental Status: Alert and oriented to person, place, and time.       Lab Review:   Lab Results   Component Value Date    GLUCOSE 96 02/26/2015    BUN 11 02/26/2015    CREATININE 0.89 02/26/2015    EGFR >60 02/26/2015    K 4.5 02/26/2015    CO2 27 02/26/2015    CALCIUM 9.5 02/26/2015    AST 24 12/03/2020    ALT 27 12/03/2020              ECG 12 Lead    Date/Time: 7/24/2023 10:38 AM  Performed by: Rhoda Yan APRN  Authorized by: Rhoda Yan APRN   Comparison: compared with previous ECG from 12/15/2022  Similar to previous ECG  Rhythm: sinus rhythm  BPM: " 75  Conduction: 1st degree AV block        [facility] - CARDIOLOGY TREADMILL STRESS TEST     PATIENT:       MARK ZIMMERMAN  MRN:           [id number]  VISIT ID:      [id number]  CATH DR:       Lio Rucker*  DATE:            ROOM:            ______________________________________________________________________________     REPORT:  1.  Negative treadmill stress test for evidence of ischemia.  2.  Six minutes exercise duration, 7 minutes workload.  3.  Hypertensive response.  4.  Asymptomatic.  5.  No arrhythmias.  6.  Mild upsloping, inferolateral ST depression, nondiagnostic.                                         __________________________                                DAVID Reich/[id number]  D:  2015 12:17:19 (Eastern Time)  T:  2015 07:10:45 (Eastern time)  Voice ID:  [id number]/Document ID:  [id number]  0      Transthoracic Echocardiogram     Patient:     MARK ZIMMERMAN                                       Med Rec#:    [id number]                    Date:     2015  MRN:         [id number]                    Pt. Type: Outpatient  Accession#:  [id number]                    Height:   182.88 cm/71.3   :         1950                 Weight:   89.36 kg/196.6   Age:         64y                        BSA:      2.12                        Sex:         M                             Referring:      Bia Valdez MD  Reading:        Lio Rucker MD  Sonographer:    Jenn Javed  Ordering:       Lio Rucker MD  ______________________________________________________________________     ICD Codes: Atrial Fibrillation (427.31)   Indication:      Rhythm:     HR         /80     Conclusions:     Findings are consistant with hypertensive heart disease with normal LV  systolic function and evidence of diastolic dysfunction.NSR.    Assessment:      Problem List Items Addressed This Visit          Cardiac and Vasculature    PAF (paroxysmal atrial fibrillation) - Primary    HTN  (hypertension)       Plan:     1. Paroxysmal atrial fibrillation: Stable, in sinus rhythm today.  Reports no symptoms with original diagnosis, nor any documented recurrences since.    -CHADS2-VASc=2.  Continue Xarelto 20 mg daily immediately after largest meal of the day.  -Okay to remain off of digoxin as he is maintaining normal sinus rhythm and has not subsequently developed any palpitations without this.  - I also advised that he call us should he develop palpitations or new onset fatigue, exertional dyspnea, or any other symptom that might be reflective of a recurrence of atrial fibrillation.     2. Chronic anticoagulation: Doing well without bleeding concerns on Xarelto. Advised to take it after eating as per above.     3. Essential hypertension: Borderline elevated today.  He states this is well controlled despite stopping lisinopril 5 mg several months ago.  Continue to monitor and follow closely with PCP.     Follow up in 6 months with Dr. Krause, or sooner with any new or worsening symptoms.      I spent 35 minutes caring for Bobby on this date of service. This time includes time spent by me in the following activities: preparing for the visit, reviewing tests, obtaining and/or reviewing a separately obtained history, performing a medically appropriate examination and/or evaluation, counseling and educating the patient/family/caregiver, and documenting information in the medical record

## 2023-08-08 NOTE — PROGRESS NOTES
Subjective    Mr. Christina is 73 y.o. male    Chief Complaint: Elevated PSA    History of Present Illness    73-year-old male established patient on chronic anticoagulation with Xarelto follow-up for enlarged prostate.    His PSA came down to 2.25 from 5.1 as expected on finasteride.  However, since last year, he has stopped both the finasteride and tamsulosin.  He has noted worsening of his LUTS off of these medications and would like to restart.  He denies hematuria, or flank pain.     The following portions of the patient's history were reviewed and updated as appropriate: allergies, current medications, past family history, past medical history, past social history, past surgical history and problem list.    Review of Systems      Current Outpatient Medications:     rivaroxaban (XARELTO) 20 MG tablet, Take 1 tablet by mouth Daily With Lunch., Disp: 90 tablet, Rfl: 3    therapeutic multivitamin-minerals (THERAGRAN-M) tablet, Take  by mouth., Disp: , Rfl:     finasteride (PROSCAR) 5 MG tablet, Take 1 tablet by mouth Daily., Disp: 90 tablet, Rfl: 3    tamsulosin (FLOMAX) 0.4 MG capsule 24 hr capsule, Take 1 capsule by mouth Every Night., Disp: 90 capsule, Rfl: 3    Past Medical History:   Diagnosis Date    Abnormal ECG     Allergic rhinitis     Arrhythmia     Arthritis     Atrial fibrillation     Garza's esophagus     GERD (gastroesophageal reflux disease)     Hx of colonic polyps     Hyperlipidemia     Hypertension        Past Surgical History:   Procedure Laterality Date    COLONOSCOPY N/A 03/01/2022    Procedure: COLONOSCOPY WITH ANESTHESIA;  Surgeon: Bernardo Swann MD;  Location: [facility] ENDOSCOPY;  Service: Gastroenterology;  Laterality: N/A;  pre screen  post polyps; diverticulosis      COLONOSCOPY W/ POLYPECTOMY  01/19/2015    Tubular adenomatous polyp ascending colon, Polyp at 40 cm insufficient tissue for diagnosis, Diverticulosis repeat exam in 5 years    ENDOSCOPY  03/23/2015    Intestinal  "Metaplasia mild to moderate chronic inflammation, HH repeat exam in 3 years    ENDOSCOPY N/A 03/01/2022    Procedure: ESOPHAGOGASTRODUODENOSCOPY WITH ANESTHESIA;  Surgeon: Bernardo Swann MD;  Location: [facility] ENDOSCOPY;  Service: Gastroenterology;  Laterality: N/A;  pre barretts  post same  Dr.J. Valdez    UPPER GASTROINTESTINAL ENDOSCOPY  12/13/2010    [facility]       Social History     Socioeconomic History    Marital status:    Tobacco Use    Smoking status: Never    Smokeless tobacco: Never   Vaping Use    Vaping Use: Never used   Substance and Sexual Activity    Alcohol use: Yes     Alcohol/week: 14.0 standard drinks     Types: 14 Glasses of wine per week    Drug use: No    Sexual activity: Defer       Family History   Problem Relation Age of Onset    Colon polyps Brother     Ulcerative colitis Brother     Colon cancer Neg Hx        Objective    Temp 97.5 øF (36.4 øC)   Ht 188 cm (74\")   Wt 91.6 kg (202 lb)   BMI 25.94 kg/mý     Physical Exam        Results for orders placed or performed during the hospital encounter of 01/23/23   Wound Culture - Wound, Back    Specimen: Back; Wound   Result Value Ref Range    Wound Culture Scant growth (1+) Actinomyces israelii (A)     Gram Stain Few (2+) WBCs seen     Gram Stain Moderate (3+) Gram negative bacilli     Gram Stain Few (2+) Gram positive cocci      Assessment and Plan    Diagnoses and all orders for this visit:    1. Benign prostatic hyperplasia with urinary frequency (Primary)  -     tamsulosin (FLOMAX) 0.4 MG capsule 24 hr capsule; Take 1 capsule by mouth Every Night.  Dispense: 90 capsule; Refill: 3  -     finasteride (PROSCAR) 5 MG tablet; Take 1 tablet by mouth Daily.  Dispense: 90 tablet; Refill: 3    2. Chronic anticoagulation  -     Cancel: POC Urinalysis Dipstick, Multipro    Worsening LUTS off of his prostate medications.  Restart finasteride and tamsulosin. He will follow-up with our nurse practitioner or PA in 1 year or sooner as needed "         This document has been signed by KIANNA Porter MD on August 16, 2023 21:43 CDT

## 2023-08-16 ENCOUNTER — OFFICE VISIT (OUTPATIENT)
Dept: UROLOGY | Facility: CLINIC | Age: 73
End: 2023-08-16
Payer: MEDICARE

## 2023-08-16 VITALS — TEMPERATURE: 97.5 F | HEIGHT: 74 IN | WEIGHT: 202 LBS | BODY MASS INDEX: 25.93 KG/M2

## 2023-08-16 DIAGNOSIS — Z79.01 CHRONIC ANTICOAGULATION: ICD-10-CM

## 2023-08-16 DIAGNOSIS — N40.1 BENIGN PROSTATIC HYPERPLASIA WITH URINARY FREQUENCY: Primary | ICD-10-CM

## 2023-08-16 DIAGNOSIS — R35.0 BENIGN PROSTATIC HYPERPLASIA WITH URINARY FREQUENCY: Primary | ICD-10-CM

## 2023-08-16 RX ORDER — FINASTERIDE 5 MG/1
5 TABLET, FILM COATED ORAL DAILY
Qty: 90 TABLET | Refills: 3 | Status: SHIPPED | OUTPATIENT
Start: 2023-08-16

## 2023-08-16 RX ORDER — TAMSULOSIN HYDROCHLORIDE 0.4 MG/1
1 CAPSULE ORAL NIGHTLY
Qty: 90 CAPSULE | Refills: 3 | Status: SHIPPED | OUTPATIENT
Start: 2023-08-16

## 2023-10-06 PROCEDURE — 87070 CULTURE OTHR SPECIMN AEROBIC: CPT | Performed by: NURSE PRACTITIONER

## 2023-10-06 PROCEDURE — 87205 SMEAR GRAM STAIN: CPT | Performed by: NURSE PRACTITIONER

## 2024-02-21 ENCOUNTER — DOCUMENTATION (OUTPATIENT)
Dept: BARIATRICS/WEIGHT MGMT | Facility: CLINIC | Age: 74
End: 2024-02-21
Payer: MEDICARE

## 2024-02-21 PROBLEM — L02.212 ABSCESS OF LOWER BACK: Status: ACTIVE | Noted: 2024-02-21

## 2024-02-21 PROCEDURE — 87205 SMEAR GRAM STAIN: CPT | Performed by: NURSE PRACTITIONER

## 2024-02-21 PROCEDURE — 87070 CULTURE OTHR SPECIMN AEROBIC: CPT | Performed by: NURSE PRACTITIONER

## 2024-02-21 PROCEDURE — 87186 SC STD MICRODIL/AGAR DIL: CPT | Performed by: NURSE PRACTITIONER

## 2024-02-21 NOTE — PROGRESS NOTES
General Surgery   History and Physical     Referring Provider: No ref. provider found    Patient Care Team:  Bia Valdez DO as PCP - General (Family Medicine)  Bernardo Swann MD as Consulting Physician (Gastroenterology)  Davin Puri MD (Inactive) as Consulting Physician (Urology)  Francisco Porter MD as Consulting Physician (Urology)  Kalyani Arguelles MD as Surgeon (General Surgery)    Chief complaint: Back abscess    Subjective      History of present illness:  The patient is a 73 y.o. male presents to the acute care facility due to a recurrent back abscess.  This has been presented several months however recently it has become more painful.  It has self drained in the past however today it was evaluated by acute care facilities and they recommended surgical consultation.  Patient denies fevers or chills.  He does state that he has had multiple acne issues on his back in the past.    Review of Systems    Review of Systems - General ROS: negative  ENT ROS: negative  Respiratory ROS: no cough, shortness of breath, or wheezing  Cardiovascular ROS: no chest pain or dyspnea on exertion  Gastrointestinal ROS: no abdominal pain, change in bowel habits, or black or bloody stools    History  Past Medical History:   Diagnosis Date    Abnormal ECG     Allergic rhinitis     Arrhythmia     Arthritis     Atrial fibrillation     Garza's esophagus     GERD (gastroesophageal reflux disease)     Hx of colonic polyps     Hyperlipidemia     Hypertension    ,   Past Surgical History:   Procedure Laterality Date    COLONOSCOPY N/A 03/01/2022    Procedure: COLONOSCOPY WITH ANESTHESIA;  Surgeon: Bernardo Swann MD;  Location: [facility] ENDOSCOPY;  Service: Gastroenterology;  Laterality: N/A;  pre screen  post polyps; diverticulosis      COLONOSCOPY W/ POLYPECTOMY  01/19/2015    Tubular adenomatous polyp ascending colon, Polyp at 40 cm insufficient tissue for diagnosis, Diverticulosis repeat exam in 5  years    ENDOSCOPY  03/23/2015    Intestinal Metaplasia mild to moderate chronic inflammation, HH repeat exam in 3 years    ENDOSCOPY N/A 03/01/2022    Procedure: ESOPHAGOGASTRODUODENOSCOPY WITH ANESTHESIA;  Surgeon: Bernardo Swann MD;  Location: [facility] ENDOSCOPY;  Service: Gastroenterology;  Laterality: N/A;  pre barretts  post same  Dr.J. Valdez    UPPER GASTROINTESTINAL ENDOSCOPY  12/13/2010    [facility]   ,   Family History   Problem Relation Age of Onset    Colon polyps Brother     Ulcerative colitis Brother     Colon cancer Neg Hx    ,   Social History     Tobacco Use    Smoking status: Never    Smokeless tobacco: Never   Vaping Use    Vaping Use: Never used   Substance Use Topics    Alcohol use: Yes     Alcohol/week: 14.0 standard drinks of alcohol     Types: 14 Glasses of wine per week    Drug use: No   , (Not in a hospital admission)   and Allergies:  Statins    Current Outpatient Medications:     rivaroxaban (XARELTO) 20 MG tablet, Take 1 tablet by mouth Daily With Lunch., Disp: 90 tablet, Rfl: 3    sulfamethoxazole-trimethoprim (BACTRIM DS,SEPTRA DS) 800-160 MG per tablet, Take 1 tablet by mouth 2 (Two) Times a Day for 10 days., Disp: 20 tablet, Rfl: 0    therapeutic multivitamin-minerals (THERAGRAN-M) tablet, Take 1 tablet by mouth Daily., Disp: , Rfl:     Objective     Vital Signs   Temp:  [97.8 °F (36.6 °C)] 97.8 °F (36.6 °C)  Heart Rate:  [102] 102  Resp:  [20] 20  BP: (146)/(82) 146/82    Physical Exam:  Physical Exam  Constitutional:       Appearance: Normal appearance.   Skin:     Findings: Abscess, acne and erythema present.             Comments: Areas consistent with an abscess in the mid back location.  Fluctuant under the area of concern.   Neurological:      Mental Status: He is alert.          Results Review:     Lab Results (last 24 hours)       ** No results found for the last 24 hours. **          Imaging Results (Last 24 Hours)       ** No results found for the last 24 hours. **             ASSESSMENT/PLAN    Patient Active Problem List   Diagnosis    Garza's esophagus without dysplasia    Gastroesophageal reflux disease with esophagitis    Elevated prostate specific antigen (PSA)    Benign prostatic hyperplasia without lower urinary tract symptoms    PAF (paroxysmal atrial fibrillation)    Hyperlipidemia    Chronic anticoagulation    HTN (hypertension)    Abscess of lower back      With respect to the abscess of the lower back plan is to I&D area.  It is noted patient is on Xarelto.    After patient was explained alternatives, benefits, complications and risks verbal consent was obtained.  Patient was placed in the prone position.  I locally anesthetized the wound site and abscess location.  Prior to doing so Betadine was used to clean and prepped the skin of the surgical site.  After locally anesthetizing the area and adequate anesthetic was applied I incised the area in a cruciate fashion.  Cultures were sent off.  I bluntly dissected under the dermis and evacuated the abscess fluid and contents.  I then packed with 1/4 inch Nu Gauze and covered with 4 x 4 and secured with tape.  He was instructed not to take his Xarelto tonight.  He is to change his dressings and packing in the a.m.  He will be placed on antibiotics and will follow-up with us in 1 week's time or as needed.          Brenton Sutton MD  02/21/24  11:59 CST

## 2024-02-27 ENCOUNTER — OFFICE VISIT (OUTPATIENT)
Dept: BARIATRICS/WEIGHT MGMT | Facility: CLINIC | Age: 74
End: 2024-02-27
Payer: MEDICARE

## 2024-02-27 VITALS
BODY MASS INDEX: 25.93 KG/M2 | HEART RATE: 95 BPM | DIASTOLIC BLOOD PRESSURE: 72 MMHG | HEIGHT: 74 IN | OXYGEN SATURATION: 95 % | TEMPERATURE: 98.2 F | WEIGHT: 202 LBS | SYSTOLIC BLOOD PRESSURE: 106 MMHG

## 2024-02-27 DIAGNOSIS — L02.212 ABSCESS OF LOWER BACK: Primary | ICD-10-CM

## 2024-02-27 PROBLEM — Z51.89 WOUND CHECK, ABSCESS: Status: ACTIVE | Noted: 2024-02-27

## 2024-02-27 PROCEDURE — 1159F MED LIST DOCD IN RCRD: CPT | Performed by: SURGERY

## 2024-02-27 PROCEDURE — 1160F RVW MEDS BY RX/DR IN RCRD: CPT | Performed by: SURGERY

## 2024-02-27 PROCEDURE — 3078F DIAST BP <80 MM HG: CPT | Performed by: SURGERY

## 2024-02-27 PROCEDURE — 3074F SYST BP LT 130 MM HG: CPT | Performed by: SURGERY

## 2024-02-27 PROCEDURE — 99024 POSTOP FOLLOW-UP VISIT: CPT | Performed by: SURGERY

## 2024-02-27 NOTE — PROGRESS NOTES
General Surgery   History and Physical     Referring Provider: No ref. provider found    Patient Care Team:  Bia Valdez DO as PCP - General (Family Medicine)  Bernardo Swann MD as Consulting Physician (Gastroenterology)  Davin Puri MD (Inactive) as Consulting Physician (Urology)  Francisco Porter MD as Consulting Physician (Urology)  Kalyani Arguelles MD as Surgeon (General Surgery)      Subjective      History of present illness:  The patient is a 73 y.o. male here for wound check.  He states that he is doing good.  He is here as recommended.  He did stop packing a few days ago.  He denies fevers or chills.  He is completing his antibiotic regimen as prescribed.    Review of Systems    Review of Systems - General ROS: negative    History  Past Medical History:   Diagnosis Date    Abnormal ECG     Allergic rhinitis     Arrhythmia     Arthritis     Atrial fibrillation     Garza's esophagus     GERD (gastroesophageal reflux disease)     Hx of colonic polyps     Hyperlipidemia     Hypertension    ,   Past Surgical History:   Procedure Laterality Date    COLONOSCOPY N/A 03/01/2022    Procedure: COLONOSCOPY WITH ANESTHESIA;  Surgeon: Bernardo Swann MD;  Location:  [facility] ENDOSCOPY;  Service: Gastroenterology;  Laterality: N/A;  pre screen  post polyps; diverticulosis      COLONOSCOPY W/ POLYPECTOMY  01/19/2015    Tubular adenomatous polyp ascending colon, Polyp at 40 cm insufficient tissue for diagnosis, Diverticulosis repeat exam in 5 years    ENDOSCOPY  03/23/2015    Intestinal Metaplasia mild to moderate chronic inflammation, HH repeat exam in 3 years    ENDOSCOPY N/A 03/01/2022    Procedure: ESOPHAGOGASTRODUODENOSCOPY WITH ANESTHESIA;  Surgeon: Bernardo Swann MD;  Location: [facility] ENDOSCOPY;  Service: Gastroenterology;  Laterality: N/A;  pre barretts  post same  Dr.J. Valdez    UPPER GASTROINTESTINAL ENDOSCOPY  12/13/2010    [facility]   ,   Family History   Problem Relation  Age of Onset    Colon polyps Brother     Ulcerative colitis Brother     Colon cancer Neg Hx    ,   Social History     Tobacco Use    Smoking status: Never    Smokeless tobacco: Never   Vaping Use    Vaping Use: Never used   Substance Use Topics    Alcohol use: Yes     Alcohol/week: 14.0 standard drinks of alcohol     Types: 14 Glasses of wine per week    Drug use: No   , (Not in a hospital admission)   and Allergies:  Statins    Current Outpatient Medications:     rivaroxaban (XARELTO) 20 MG tablet, Take 1 tablet by mouth Daily With Lunch., Disp: 90 tablet, Rfl: 3    sulfamethoxazole-trimethoprim (BACTRIM DS,SEPTRA DS) 800-160 MG per tablet, Take 1 tablet by mouth 2 (Two) Times a Day for 10 days., Disp: 20 tablet, Rfl: 0    therapeutic multivitamin-minerals (THERAGRAN-M) tablet, Take 1 tablet by mouth Daily., Disp: , Rfl:     Objective     Vital Signs   Temp:  [98.2 °F (36.8 °C)] 98.2 °F (36.8 °C)  Heart Rate:  [95] 95  BP: (106)/(72) 106/72    Physical Exam:  Physical Exam  Skin:     General: Skin is warm.      Findings: Ecchymosis and laceration present. No erythema.             Comments: Mild edema, fluctuant fluid below.  Wound sealed.          Results Review:     Lab Results (last 24 hours)       ** No results found for the last 24 hours. **          Imaging Results (Last 24 Hours)       ** No results found for the last 24 hours. **            ASSESSMENT/PLAN   Diagnosis Plan   1. Abscess of lower back           Incision and drainage of wound due to premature closure.  Prior to doing the procedure I performed a timeout.  Consent was obtained.  I locally anesthetized the area and incised wound track and evacuated serosanguineous fluid no evidence of abscess present.  I repacked the area with quarter inch Nu Gauze.  This gauze was soaked in peroxide.  I instructed the patient to do so daily for at least 1 week's time.  He is to follow-up with us in 1 week's time for reassessment of wound.  Patient tolerated the  procedure well.  All instruments were accounted for prior to completion of the procedure.    Brenton Sutton MD  02/27/24  10:30 CST

## 2024-03-05 ENCOUNTER — OFFICE VISIT (OUTPATIENT)
Dept: BARIATRICS/WEIGHT MGMT | Facility: CLINIC | Age: 74
End: 2024-03-05
Payer: MEDICARE

## 2024-03-05 VITALS
OXYGEN SATURATION: 96 % | BODY MASS INDEX: 26 KG/M2 | SYSTOLIC BLOOD PRESSURE: 105 MMHG | HEIGHT: 74 IN | HEART RATE: 84 BPM | WEIGHT: 202.6 LBS | TEMPERATURE: 98 F | DIASTOLIC BLOOD PRESSURE: 71 MMHG

## 2024-03-05 DIAGNOSIS — Z51.89 WOUND CHECK, ABSCESS: Primary | ICD-10-CM

## 2024-03-05 PROCEDURE — 3074F SYST BP LT 130 MM HG: CPT | Performed by: SURGERY

## 2024-03-05 PROCEDURE — 99024 POSTOP FOLLOW-UP VISIT: CPT | Performed by: SURGERY

## 2024-03-05 PROCEDURE — 1159F MED LIST DOCD IN RCRD: CPT | Performed by: SURGERY

## 2024-03-05 PROCEDURE — 3078F DIAST BP <80 MM HG: CPT | Performed by: SURGERY

## 2024-03-05 PROCEDURE — 1160F RVW MEDS BY RX/DR IN RCRD: CPT | Performed by: SURGERY

## 2024-03-05 NOTE — PROGRESS NOTES
"  Patient Care Team:  Bia Valdez DO as PCP - General (Family Medicine)  Bernardo Swann MD as Consulting Physician (Gastroenterology)  Davin Puri MD (Inactive) as Consulting Physician (Urology)  Francisco Porter MD as Consulting Physician (Urology)  Kalyani Arguelles MD as Surgeon (General Surgery)    Subjective     Bobby Christina is a 73 y.o. male.     Bobby is post procedure on lower back wound.  His wound he reports as improving.  It is currently packed daily.  He is without complaints of fevers or chills.        Review Of Systems:  General ROS: negative  Respiratory ROS: no cough, shortness of breath, or wheezing  Cardiovascular ROS: no chest pain or dyspnea on exertion    The following portions of the patient's history were reviewed and updated as appropriate: allergies, current medications, past family history, past medical history, past social history, past surgical history, and problem list.    Objective   /71 (BP Location: Right arm, Patient Position: Sitting, Cuff Size: Adult)   Pulse 84   Temp 98 °F (36.7 °C)   Ht 188 cm (74\")   Wt 91.9 kg (202 lb 9.6 oz)   SpO2 96%   BMI 26.01 kg/m²       03/05/24  0849   Weight: 91.9 kg (202 lb 9.6 oz)        General Appearance:  awake, alert, oriented, in no acute distress    Wounds:  Mid lower back wound packed.  Packing removed.  Wound site has good granulation tissue present.  No erythema or edema noted around the edges.  Wound was repacked with gauze and gauze was soaked in peroxide.  Secured with tape.  He is to continue to do so daily.    ASSESSMENT/ PLAN    Encounter Diagnoses   Name Primary?    Wound check, abscess Yes     May follow-up as needed.    03/05/24  11:38 CST  Patient Care Team:  Bia Valdez DO as PCP - General (Family Medicine)  Bernardo Swann MD as Consulting Physician (Gastroenterology)  Davin Puri MD (Inactive) as Consulting Physician (Urology)  Francisco Porter MD as Consulting " Physician (Urology)  Kalyani Arguelles MD as Surgeon (General Surgery)  Brenton Sutton MD FACS

## 2024-03-08 ENCOUNTER — OFFICE VISIT (OUTPATIENT)
Dept: CARDIOLOGY | Facility: CLINIC | Age: 74
End: 2024-03-08
Payer: MEDICARE

## 2024-03-08 VITALS
WEIGHT: 202 LBS | HEIGHT: 74 IN | OXYGEN SATURATION: 97 % | BODY MASS INDEX: 25.93 KG/M2 | SYSTOLIC BLOOD PRESSURE: 136 MMHG | HEART RATE: 88 BPM | DIASTOLIC BLOOD PRESSURE: 81 MMHG

## 2024-03-08 DIAGNOSIS — Z79.01 CHRONIC ANTICOAGULATION: ICD-10-CM

## 2024-03-08 DIAGNOSIS — I48.0 PAF (PAROXYSMAL ATRIAL FIBRILLATION): Primary | ICD-10-CM

## 2024-03-08 PROBLEM — I10 HTN (HYPERTENSION): Chronic | Status: ACTIVE | Noted: 2022-12-15

## 2024-03-08 NOTE — PROGRESS NOTES
"     Subjective:     Encounter Date:03/08/2024      Patient ID: Bobby Christina is a 73 y.o. male.    Chief Complaint: Routine follow-up of atrial fibrillation    History of Present Illness    Mr. Christina was last seen in the office in 07/2023 for the above, and was doing well. He is tolerating Xarelto, and remaining in sinus rhythm. Digoxin was discontinued, and he returns now for routine follow-up.    The patient states he feels better after discontinuing digoxin. He states he decided to start doing a little bit of \"light work,\" but no weightlifting or hard pushing. He has been doing stretching, and deep breathing exercises since around 12/2023. He states he has not exerted himself. The patient denies any chest discomfort, pressure, tightness, squeezing, or trouble breathing. He denies any palpitations. He takes Xarelto with food in his stomach. He has not felt like he has gotten out of rhythm at any point, but notes that he does not remember having any symptoms when told he was in atrial fibrillation previously.      The following portions of the patient's history were reviewed and updated as appropriate: allergies, current medications, past family history, past medical history, past social history, past surgical history, and problem list.    Review of Systems   Constitutional: Negative for malaise/fatigue.   Cardiovascular:  Negative for chest pain, claudication, dyspnea on exertion, leg swelling, near-syncope, orthopnea, palpitations, paroxysmal nocturnal dyspnea and syncope.   Respiratory:  Negative for shortness of breath.    Hematologic/Lymphatic: Does not bruise/bleed easily.           Current Outpatient Medications:     rivaroxaban (XARELTO) 20 MG tablet, Take 1 tablet by mouth Daily With Lunch., Disp: 90 tablet, Rfl: 3    therapeutic multivitamin-minerals (THERAGRAN-M) tablet, Take 1 tablet by mouth Daily., Disp: , Rfl:        Objective:      Vitals:    03/08/24 1001   BP: 136/81   Pulse: 88   SpO2: 97% "     Vitals and nursing note reviewed.   Constitutional:       General: Not in acute distress.     Appearance: Not in distress.   Neck:      Vascular: No JVD or JVR. JVD normal.   Pulmonary:      Effort: Pulmonary effort is normal.      Breath sounds: Normal breath sounds.   Cardiovascular:      Normal rate. Regular rhythm.      Murmurs: There is no murmur.      No gallop.  No rub.   Pulses:     Intact distal pulses.   Edema:     Peripheral edema absent.   Skin:     General: Skin is warm and dry.   Neurological:      Mental Status: Alert, oriented to person, place, and time and oriented to person, place and time.       Lab Review:         ECG 12 Lead    Date/Time: 3/8/2024 10:13 AM  Performed by: Gianni Krause MD    Authorized by: Gianni Krause MD  Comparison: compared with previous ECG from 7/24/2023  Similar to previous ECG  Rhythm: sinus rhythm  Conduction: left anterior fascicular block and 1st degree AV block  Other findings: poor R wave progression    Clinical impression: abnormal EKG          Assessment/Plan:     Problem List Items Addressed This Visit          Cardiac and Vasculature    PAF (paroxysmal atrial fibrillation) - Primary       Coag and Thromboembolic    Chronic anticoagulation    Overview     Formatting of this note might be different from the original.  Xarelto              Recommendations/plans:    Mr. Christina is doing very well with no concerns. He has had no issues since stopping digoxin last year. He is essentially off all medications at this point other than Xarelto, which he is tolerating well. He remains in sinus rhythm, but does acknowledge that he does not recall having symptoms when he was told he had atrial fibrillation at the time of original diagnosis. We discussed the fact this means that theoretically could still be having paroxysms intermittently that he is unaware of symptomatically, but as long as he is not symptomatically bothered by these, then no additional treatment would  be required aside from stroke protection, which he is receiving from the Xarelto.     Therefore, no changes are recommended today other than to continue Xarelto indefinitely with food. We will see him back for routine follow-up in 1 year.      Transcribed from ambient dictation for Gianni Krause MD by Isabel Hartman.  03/08/24   11:37 CST    Patient or patient representative verbalized consent to the visit recording.  I Gianni Krause MD have personally performed the services described in this document as scribed by the above individual, and it is both accurate and complete.   I have edited each component as needed.    Gianni Krause MD  3/8/2024  20:18 CST

## 2024-03-12 ENCOUNTER — OFFICE VISIT (OUTPATIENT)
Dept: BARIATRICS/WEIGHT MGMT | Facility: CLINIC | Age: 74
End: 2024-03-12
Payer: MEDICARE

## 2024-03-12 VITALS
SYSTOLIC BLOOD PRESSURE: 132 MMHG | HEIGHT: 74 IN | OXYGEN SATURATION: 95 % | WEIGHT: 205.6 LBS | DIASTOLIC BLOOD PRESSURE: 77 MMHG | TEMPERATURE: 98.2 F | HEART RATE: 85 BPM | BODY MASS INDEX: 26.39 KG/M2

## 2024-03-12 DIAGNOSIS — Z51.89 WOUND CHECK, ABSCESS: Primary | ICD-10-CM

## 2024-03-12 PROCEDURE — 1159F MED LIST DOCD IN RCRD: CPT | Performed by: SURGERY

## 2024-03-12 PROCEDURE — 3075F SYST BP GE 130 - 139MM HG: CPT | Performed by: SURGERY

## 2024-03-12 PROCEDURE — 1160F RVW MEDS BY RX/DR IN RCRD: CPT | Performed by: SURGERY

## 2024-03-12 PROCEDURE — 99024 POSTOP FOLLOW-UP VISIT: CPT | Performed by: SURGERY

## 2024-03-12 PROCEDURE — 3078F DIAST BP <80 MM HG: CPT | Performed by: SURGERY

## 2024-03-12 NOTE — PROGRESS NOTES
"  Patient Care Team:  Bia Valdez DO as PCP - General (Family Medicine)  Bernardo Swann MD as Consulting Physician (Gastroenterology)  Davin Puri MD (Inactive) as Consulting Physician (Urology)  Francisco Porter MD as Consulting Physician (Urology)  Kalyani Arguelles MD as Surgeon (General Surgery)    Subjective     Bobby Christina is a 73 y.o. male.     Bobby is here for wound check.  He is currently having his wife packed his wounds daily without incident.  He states he believes it is getting better.    Review Of Systems:  General ROS: negative  Respiratory ROS: no cough, shortness of breath, or wheezing  Cardiovascular ROS: no chest pain or dyspnea on exertion  Gastrointestinal ROS: no abdominal pain, change in bowel habits, or black or bloody stools    The following portions of the patient's history were reviewed and updated as appropriate: allergies, current medications, past family history, past medical history, past social history, past surgical history, and problem list.    Objective   /77 (BP Location: Right arm, Patient Position: Sitting, Cuff Size: Adult)   Pulse 85   Temp 98.2 °F (36.8 °C)   Ht 188 cm (74\")   Wt 93.3 kg (205 lb 9.6 oz)   SpO2 95%   BMI 26.40 kg/m²       03/12/24  0820   Weight: 93.3 kg (205 lb 9.6 oz)        General Appearance:  awake, alert, oriented, in no acute distress  Skin: Mid back wound clean dry and intact no gross erythema    ASSESSMENT/ PLAN    Encounter Diagnoses   Name Primary?    Wound check, abscess Yes     Patient is healing quite well.  May not require packing further on.  Cover area until healed secondarily.  May visit this as needed.    03/12/24  10:01 CDT  Patient Care Team:  Bia Valdez DO as PCP - General (Family Medicine)  Bernardo Swann MD as Consulting Physician (Gastroenterology)  Davin Puri MD (Inactive) as Consulting Physician (Urology)  Francisco Porter MD as Consulting Physician " (Urology)  Kalyani Arguelles MD as Surgeon (General Surgery)  Brenton Sutton MD FACS

## 2024-07-31 PROCEDURE — 87070 CULTURE OTHR SPECIMN AEROBIC: CPT | Performed by: FAMILY MEDICINE

## 2024-07-31 PROCEDURE — 87205 SMEAR GRAM STAIN: CPT | Performed by: FAMILY MEDICINE

## 2024-07-31 NOTE — PROGRESS NOTES
Subjective    Mr. Christina is 74 y.o. male    Chief Complaint: BPH    History of Present Illness  Benign Prostatic Hypertrophy  Patient complains of lower urinary tract symptoms. He reports frequency, incomplete emptying, intermittency, nocturia one time a night, straining, urgency, and weak stream. He denies  nothing . Patient states symptoms are of moderate severity. Onset of symptoms was a few years ago and was gradual in onset. His AUA Symptom Score is, 18/35.He reports a history of no complicating symptoms. He denies flank pain, gross hematuria, kidney stones, and recurrent UTI.  Patient has tried Watchful waiting with improvement.       The following portions of the patient's history were reviewed and updated as appropriate: allergies, current medications, past family history, past medical history, past social history, past surgical history and problem list.    Review of Systems   Genitourinary:  Positive for frequency and urgency.         Current Outpatient Medications:     NON FORMULARY, Allergy medicine pt is not for sure what it is, Disp: , Rfl:     rivaroxaban (XARELTO) 20 MG tablet, Take 1 tablet by mouth Daily With Lunch., Disp: 90 tablet, Rfl: 3    therapeutic multivitamin-minerals (THERAGRAN-M) tablet, Take 1 tablet by mouth Daily., Disp: , Rfl:     Past Medical History:   Diagnosis Date    Abnormal ECG     Allergic rhinitis     Arrhythmia     Arthritis     Atrial fibrillation     Garza's esophagus     GERD (gastroesophageal reflux disease)     Hx of colonic polyps     Hyperlipidemia     Hypertension        Past Surgical History:   Procedure Laterality Date    COLONOSCOPY N/A 03/01/2022    Procedure: COLONOSCOPY WITH ANESTHESIA;  Surgeon: Bernardo Swann MD;  Location: [facility] ENDOSCOPY;  Service: Gastroenterology;  Laterality: N/A;  pre screen  post polyps; diverticulosis      COLONOSCOPY W/ POLYPECTOMY  01/19/2015    Tubular adenomatous polyp ascending colon, Polyp at 40 cm insufficient  "tissue for diagnosis, Diverticulosis repeat exam in 5 years    CYST REMOVAL      ENDOSCOPY  03/23/2015    Intestinal Metaplasia mild to moderate chronic inflammation, HH repeat exam in 3 years    ENDOSCOPY N/A 03/01/2022    Procedure: ESOPHAGOGASTRODUODENOSCOPY WITH ANESTHESIA;  Surgeon: Bernardo Swann MD;  Location: [facility] ENDOSCOPY;  Service: Gastroenterology;  Laterality: N/A;  pre barretts  post same  Dr.J. Valdez    UPPER GASTROINTESTINAL ENDOSCOPY  12/13/2010    [facility] Barretts       Social History     Socioeconomic History    Marital status:    Tobacco Use    Smoking status: Never    Smokeless tobacco: Never   Vaping Use    Vaping status: Never Used   Substance and Sexual Activity    Alcohol use: Not Currently     Alcohol/week: 14.0 standard drinks of alcohol     Types: 14 Glasses of wine per week    Drug use: No    Sexual activity: Defer       Family History   Problem Relation Age of Onset    Colon polyps Brother     Ulcerative colitis Brother     Colon cancer Neg Hx        Objective    Temp 97.6 °F (36.4 °C)   Ht 188 cm (74\")   Wt 92.1 kg (203 lb)   BMI 26.06 kg/m²     Physical Exam  Vitals reviewed.   Constitutional:       Appearance: Normal appearance.   HENT:      Head: Normocephalic and atraumatic.   Pulmonary:      Effort: Pulmonary effort is normal.   Skin:     Coloration: Skin is not pale.   Neurological:      Mental Status: He is alert.   Psychiatric:         Mood and Affect: Mood normal.         Behavior: Behavior normal.             Assessment and Plan    Diagnoses and all orders for this visit:    1. Benign prostatic hyperplasia without lower urinary tract symptoms (Primary)  -     POC Urinalysis Dipstick, Multipro      Patient stated the trospium did not help symptoms he also had no response with Vesicare and tamsulosin was not effective.  We had discussion and the patient can live with his symptoms if anything changes he will return sooner otherwise he will return in 1 year.    Was not " able to provide urine specimen today he denies any difficulties urinating.

## 2024-08-15 ENCOUNTER — OFFICE VISIT (OUTPATIENT)
Dept: UROLOGY | Facility: CLINIC | Age: 74
End: 2024-08-15
Payer: MEDICARE

## 2024-08-15 VITALS — HEIGHT: 74 IN | BODY MASS INDEX: 26.05 KG/M2 | WEIGHT: 203 LBS | TEMPERATURE: 97.6 F

## 2024-08-15 DIAGNOSIS — N40.0 BENIGN PROSTATIC HYPERPLASIA WITHOUT LOWER URINARY TRACT SYMPTOMS: Primary | ICD-10-CM

## 2024-09-11 ENCOUNTER — APPOINTMENT (RX ONLY)
Dept: URBAN - METROPOLITAN AREA CLINIC 169 | Facility: CLINIC | Age: 74
Setting detail: DERMATOLOGY
End: 2024-09-11

## 2024-09-11 DIAGNOSIS — L82.1 OTHER SEBORRHEIC KERATOSIS: ICD-10-CM | Status: STABLE

## 2024-09-11 DIAGNOSIS — Z85.828 PERSONAL HISTORY OF OTHER MALIGNANT NEOPLASM OF SKIN: ICD-10-CM | Status: STABLE

## 2024-09-11 DIAGNOSIS — D18.0 HEMANGIOMA: ICD-10-CM | Status: STABLE

## 2024-09-11 DIAGNOSIS — L81.4 OTHER MELANIN HYPERPIGMENTATION: ICD-10-CM | Status: STABLE

## 2024-09-11 DIAGNOSIS — D485 NEOPLASM OF UNCERTAIN BEHAVIOR OF SKIN: ICD-10-CM

## 2024-09-11 DIAGNOSIS — D22 MELANOCYTIC NEVI: ICD-10-CM | Status: STABLE

## 2024-09-11 DIAGNOSIS — L57.0 ACTINIC KERATOSIS: ICD-10-CM | Status: WORSENING

## 2024-09-11 PROBLEM — D18.01 HEMANGIOMA OF SKIN AND SUBCUTANEOUS TISSUE: Status: ACTIVE | Noted: 2024-09-11

## 2024-09-11 PROBLEM — D22.5 MELANOCYTIC NEVI OF TRUNK: Status: ACTIVE | Noted: 2024-09-11

## 2024-09-11 PROBLEM — D48.5 NEOPLASM OF UNCERTAIN BEHAVIOR OF SKIN: Status: ACTIVE | Noted: 2024-09-11

## 2024-09-11 PROCEDURE — ? MEDICARE ABN

## 2024-09-11 PROCEDURE — ? SUNSCREEN RECOMMENDATIONS

## 2024-09-11 PROCEDURE — 11102 TANGNTL BX SKIN SINGLE LES: CPT

## 2024-09-11 PROCEDURE — 17003 DESTRUCT PREMALG LES 2-14: CPT

## 2024-09-11 PROCEDURE — ? FULL BODY SKIN EXAM

## 2024-09-11 PROCEDURE — 17000 DESTRUCT PREMALG LESION: CPT | Mod: 59

## 2024-09-11 PROCEDURE — ? BIOPSY BY SHAVE METHOD

## 2024-09-11 PROCEDURE — ? LIQUID NITROGEN

## 2024-09-11 PROCEDURE — 99213 OFFICE O/P EST LOW 20 MIN: CPT | Mod: 25

## 2024-09-11 PROCEDURE — ? COUNSELING

## 2024-09-11 ASSESSMENT — LOCATION ZONE DERM
LOCATION ZONE: TRUNK
LOCATION ZONE: FACE
LOCATION ZONE: NECK
LOCATION ZONE: HAND

## 2024-09-11 ASSESSMENT — LOCATION DETAILED DESCRIPTION DERM
LOCATION DETAILED: RIGHT CENTRAL TEMPLE
LOCATION DETAILED: LEFT RADIAL DORSAL HAND
LOCATION DETAILED: LEFT INFERIOR MEDIAL UPPER BACK
LOCATION DETAILED: INFERIOR THORACIC SPINE
LOCATION DETAILED: RIGHT SUPERIOR LATERAL NECK
LOCATION DETAILED: SUPERIOR LUMBAR SPINE

## 2024-09-11 ASSESSMENT — LOCATION SIMPLE DESCRIPTION DERM
LOCATION SIMPLE: LEFT UPPER BACK
LOCATION SIMPLE: RIGHT TEMPLE
LOCATION SIMPLE: UPPER BACK
LOCATION SIMPLE: LEFT HAND
LOCATION SIMPLE: LOWER BACK
LOCATION SIMPLE: NECK

## 2024-09-11 NOTE — PROCEDURE: MEDICARE ABN
Reason?: Possible non-covered service
Payment Option: Option 1: Bill Medicare, await for decision on payment.
Procedure (Limit To 20 Characters): ln2
Detail Level: Detailed
Reason?: Additional Information
Cost Of Treatment Patient Responsible For Paying?: 250

## 2024-11-18 PROCEDURE — 87205 SMEAR GRAM STAIN: CPT | Performed by: NURSE PRACTITIONER

## 2024-11-18 PROCEDURE — 87070 CULTURE OTHR SPECIMN AEROBIC: CPT | Performed by: NURSE PRACTITIONER

## 2025-03-24 ENCOUNTER — OFFICE VISIT (OUTPATIENT)
Dept: CARDIOLOGY | Facility: CLINIC | Age: 75
End: 2025-03-24
Payer: MEDICARE

## 2025-03-24 VITALS
DIASTOLIC BLOOD PRESSURE: 66 MMHG | SYSTOLIC BLOOD PRESSURE: 114 MMHG | OXYGEN SATURATION: 98 % | HEART RATE: 79 BPM | HEIGHT: 74 IN | WEIGHT: 201.6 LBS | BODY MASS INDEX: 25.87 KG/M2

## 2025-03-24 DIAGNOSIS — I48.92 ATRIAL FLUTTER WITH CONTROLLED RESPONSE: Primary | ICD-10-CM

## 2025-03-24 DIAGNOSIS — Z79.01 CHRONIC ANTICOAGULATION: Chronic | ICD-10-CM

## 2025-03-24 DIAGNOSIS — I48.0 PAF (PAROXYSMAL ATRIAL FIBRILLATION): Chronic | ICD-10-CM

## 2025-03-24 PROCEDURE — 3074F SYST BP LT 130 MM HG: CPT | Performed by: NURSE PRACTITIONER

## 2025-03-24 PROCEDURE — 99214 OFFICE O/P EST MOD 30 MIN: CPT | Performed by: NURSE PRACTITIONER

## 2025-03-24 PROCEDURE — 3078F DIAST BP <80 MM HG: CPT | Performed by: NURSE PRACTITIONER

## 2025-03-24 PROCEDURE — 1159F MED LIST DOCD IN RCRD: CPT | Performed by: NURSE PRACTITIONER

## 2025-03-24 PROCEDURE — 1160F RVW MEDS BY RX/DR IN RCRD: CPT | Performed by: NURSE PRACTITIONER

## 2025-03-24 PROCEDURE — 93000 ELECTROCARDIOGRAM COMPLETE: CPT | Performed by: NURSE PRACTITIONER

## 2025-03-24 NOTE — PROGRESS NOTES
"     Subjective:     Encounter Date: 3/24/2025      Patient ID: Bobby Christina is a 74 y.o. male.    Chief Complaint: Routine follow-up of atrial fibrillation    History of Present Illness    The patient presents to follow-up regarding his paroxysmal atrial fibrillation.  He was diagnosed back in 2015 and was reportedly asymptomatic.  His digoxin was discontinued in July 2023 and when he came back for follow-up in March 2024 he was still doing well and maintaining normal sinus rhythm.  He is anticoagulated with Xarelto.    Today the patient tells me that approximately 8 to 9 months ago his wife was diagnosed with breast cancer and subsequently he has had a significant lifestyle change and some increased stress but is doing pretty well.  He states he has been doing some breathing and stretching exercises.  He admits that he may be more fatigued in recent months but he describes this as \"no enthusiasm.\"  He denies any chest pain, shortness of breath, palpitations, syncope or presyncope.  He has well-controlled blood pressure without requiring antihypertensives.  He admits he often misses doses of Xarelto.      The following portions of the patient's history were reviewed and updated as appropriate: allergies, current medications, past family history, past medical history, past social history, past surgical history, and problem list.    Review of Systems   Constitutional: Positive for malaise/fatigue.   Cardiovascular:  Negative for chest pain, claudication, dyspnea on exertion, leg swelling, near-syncope, orthopnea, palpitations, paroxysmal nocturnal dyspnea and syncope.   Respiratory:  Negative for cough and shortness of breath.    Hematologic/Lymphatic: Does not bruise/bleed easily.   Musculoskeletal:  Negative for falls.   Gastrointestinal:  Negative for bloating.   Neurological:  Negative for dizziness, light-headedness and weakness.           Current Outpatient Medications:     rivaroxaban (XARELTO) 20 MG tablet, " "Take 1 tablet by mouth Daily With Lunch., Disp: 90 tablet, Rfl: 3    tamsulosin (FLOMAX) 0.4 MG capsule 24 hr capsule, Take 1 capsule by mouth Every Night., Disp: , Rfl:     therapeutic multivitamin-minerals (THERAGRAN-M) tablet, Take 1 tablet by mouth Daily., Disp: , Rfl:        Objective:      Vitals:    03/24/25 0844   BP: 114/66   Pulse: 79   SpO2: 98%   Weight - 201 lbs     Vitals and nursing note reviewed.   Constitutional:       General: Not in acute distress.     Appearance: Well-developed and not in distress. Not diaphoretic.   Neck:      Vascular: No JVD or JVR. JVD normal.   Pulmonary:      Effort: Pulmonary effort is normal. No respiratory distress.      Breath sounds: Normal breath sounds.   Cardiovascular:      Normal rate. Irregular rhythm.      Murmurs: There is no murmur.   Edema:     Peripheral edema absent.   Abdominal:      Tenderness: There is no abdominal tenderness.   Skin:     General: Skin is warm and dry.   Neurological:      Mental Status: Alert, oriented to person, place, and time and oriented to person, place and time.       Lab Review:   Lab Results   Component Value Date    GLUCOSE 96 02/26/2015    BUN 11 02/26/2015    CREATININE 0.89 02/26/2015     02/26/2015    K 4.5 02/26/2015     02/26/2015    CALCIUM 9.5 02/26/2015    ALT 27 12/03/2020    AST 24 12/03/2020    ANIONGAP 9 02/26/2015    EGFR >60 02/26/2015        Lab Results   Component Value Date    CHLPL 232 (H) 12/03/2020    TRIG 85 12/03/2020    HDL 56 12/03/2020     12/03/2020        No results found for: \"HGBA1C\"     Lab Results   Component Value Date    TSH 4.14 02/26/2015            ECG 12 Lead    Date/Time: 3/24/2025 9:41 AM  Performed by: Rhoda Yan APRN    Authorized by: Rhoda Yan APRN  Comparison: compared with previous ECG from 3/8/2024  Comparison to previous ECG: Atrial flutter replaces NSR   Rhythm: atrial flutter  BPM: 79    Clinical impression: abnormal EKG          Assessment/Plan: " "    Problem List Items Addressed This Visit          Cardiac and Vasculature    PAF (paroxysmal atrial fibrillation) (Chronic)       Coag and Thromboembolic    Chronic anticoagulation (Chronic)    Overview   Formatting of this note might be different from the original.  Xarelto          Other Visit Diagnoses         Atrial flutter with controlled response    -  Primary    Relevant Orders    Holter Monitor - 72 Hour Up To 15 Days              Recommendations/plans:    The patient is incidentally discovered to be in rate controlled atrial flutter today.  It is unclear whether or not he is symptomatic to this but he does report in more recent months he has had \"no enthusiasm\"/might be more fatigued.  He has been missing doses of Xarelto intermittently.  We discussed the importance of taking this with food daily without interruption.  I will have him placed in a 7-day Zio patch today.  If his atrial flutter is persistent we will plan for cardioversion after 4 weeks of uninterrupted anticoagulation to see if he has symptomatic improvement with restoration of normal sinus rhythm.  He voices understanding and is in agreement with the plan of care.    I have tentatively ordered an 8-week follow-up but this may be rescheduled depending upon Holter monitor results.  We will be in touch with him after we receive these.    I spent 34 minutes caring for Bobby on this date of service. This time includes time spent by me in the following activities: preparing for the visit, reviewing tests, performing a medically appropriate examination and/or evaluation, counseling and educating the patient/family/caregiver, and documenting information in the medical record         "

## 2025-04-11 LAB
CV ZIO AFL AVG BPM: 72 BPM
CV ZIO AFL BPM HIGH: 130 BPM
CV ZIO AFL BPM LOW: 43 BPM
CV ZIO AFL F EPI AVG BPM: 117 BPM
CV ZIO AFL F EPI BPM HIGH: 130 BPM
CV ZIO AFL F EPI BPM LOW: 101 BPM
CV ZIO AFL F EPI DT: NORMAL
CV ZIO AFL PERCENT: 100 %
CV ZIO AFL S EPI AVG BPM: 65 BPM
CV ZIO AFL S EPI BPM HIGH: 100 BPM
CV ZIO AFL S EPI BPM LOW: 43 BPM
CV ZIO AFL S EPI DT: NORMAL
CV ZIO BASELINE AVG BPM: 72 BPM
CV ZIO BASELINE BPM HIGH: 267 BPM
CV ZIO BASELINE BPM LOW: 43 BPM
CV ZIO DEVICE ANALYSIS TIME: NORMAL
CV ZIO ECT SVE COUNT: 0 EPISODES
CV ZIO ECT SVE CPLT COUNT: 0 EPISODES
CV ZIO ECT SVE CPLT FREQ: 0
CV ZIO ECT SVE FREQ: 0
CV ZIO ECT SVE TPLT COUNT: 0 EPISODES
CV ZIO ECT SVE TPLT FREQ: 0
CV ZIO ECT VE COUNT: 4375 EPISODES
CV ZIO ECT VE CPLT COUNT: 23 EPISODES
CV ZIO ECT VE CPLT FREQ: NORMAL
CV ZIO ECT VE FREQ: NORMAL
CV ZIO ECT VE TPLT COUNT: 1 EPISODES
CV ZIO ECT VE TPLT FREQ: NORMAL
CV ZIO ECTOPIC SVE COUPLET RAW PERCENT: 0 %
CV ZIO ECTOPIC SVE ISOLATED PERCENT: 0 %
CV ZIO ECTOPIC SVE TRIPLET RAW PERCENT: 0 %
CV ZIO ECTOPIC VE COUPLET RAW PERCENT: 0.01 %
CV ZIO ECTOPIC VE ISOLATED PERCENT: 0.63 %
CV ZIO ECTOPIC VE TRIPLET RAW PERCENT: 0 %
CV ZIO ENROLLMENT END: NORMAL
CV ZIO ENROLLMENT START: NORMAL
CV ZIO IRREG TYPE: NORMAL
CV ZIO L BIGEMINY DUR: 19.4 SEC
CV ZIO L BIGEMINY END: NORMAL
CV ZIO L BIGEMINY START: NORMAL
CV ZIO L TRIGEMINY DUR: 9.8 SEC
CV ZIO L TRIGEMINY END: NORMAL
CV ZIO L TRIGEMINY START: NORMAL
CV ZIO PATIENT EVENTS DIARIES: 0
CV ZIO PATIENT EVENTS TRIGGERS: 0
CV ZIO PAUSE COUNT: 0
CV ZIO PRESCRIPTION STATUS: NORMAL
CV ZIO SVT COUNT: 0
CV ZIO TOTAL  ENROLLMENT PERIOD: NORMAL
CV ZIO VT AVG BPM: 174 BPM
CV ZIO VT BPM HIGH: 267 BPM
CV ZIO VT BPM LOW: 82 BPM
CV ZIO VT COUNT: 2
CV ZIO VT F EPI AVG BPM: 214
CV ZIO VT F EPI BEATS: 21 BEATS
CV ZIO VT F EPI BPM HIGH: 267
CV ZIO VT F EPI BPM LOW: 150
CV ZIO VT F EPI DUR: 6 SEC
CV ZIO VT F EPI END: NORMAL
CV ZIO VT F EPI START: NORMAL
CV ZIO VT L EPI AVG BPM: 214
CV ZIO VT L EPI BEATS: 21 BEATS
CV ZIO VT L EPI BPM HIGH: 267 BPM
CV ZIO VT L EPI BPM LOW: 150 BPM
CV ZIO VT L EPI DUR: 6
CV ZIO VT L EPI END: NORMAL
CV ZIO VT L EPI START: NORMAL

## 2025-04-19 DIAGNOSIS — I48.92 ATRIAL FLUTTER WITH CONTROLLED RESPONSE: Primary | ICD-10-CM

## 2025-04-19 RX ORDER — SODIUM CHLORIDE 0.9 % (FLUSH) 0.9 %
10 SYRINGE (ML) INJECTION EVERY 12 HOURS SCHEDULED
OUTPATIENT
Start: 2025-04-19

## 2025-04-19 RX ORDER — SODIUM CHLORIDE 0.9 % (FLUSH) 0.9 %
10 SYRINGE (ML) INJECTION AS NEEDED
OUTPATIENT
Start: 2025-04-19

## 2025-04-19 RX ORDER — SODIUM CHLORIDE 9 MG/ML
40 INJECTION, SOLUTION INTRAVENOUS AS NEEDED
OUTPATIENT
Start: 2025-04-19

## 2025-05-12 ENCOUNTER — ANESTHESIA (OUTPATIENT)
Dept: CARDIOLOGY | Facility: HOSPITAL | Age: 75
End: 2025-05-12
Payer: MEDICARE

## 2025-05-12 ENCOUNTER — HOSPITAL ENCOUNTER (OUTPATIENT)
Dept: CARDIOLOGY | Facility: HOSPITAL | Age: 75
Discharge: HOME | End: 2025-05-12
Payer: MEDICARE

## 2025-05-12 ENCOUNTER — ANESTHESIA EVENT (OUTPATIENT)
Dept: CARDIOLOGY | Facility: HOSPITAL | Age: 75
End: 2025-05-12
Payer: MEDICARE

## 2025-05-12 VITALS
RESPIRATION RATE: 14 BRPM | HEART RATE: 71 BPM | TEMPERATURE: 98 F | BODY MASS INDEX: 25.54 KG/M2 | OXYGEN SATURATION: 98 % | DIASTOLIC BLOOD PRESSURE: 93 MMHG | WEIGHT: 199 LBS | SYSTOLIC BLOOD PRESSURE: 147 MMHG | HEIGHT: 74 IN

## 2025-05-12 VITALS
RESPIRATION RATE: 7 BRPM | SYSTOLIC BLOOD PRESSURE: 113 MMHG | OXYGEN SATURATION: 95 % | DIASTOLIC BLOOD PRESSURE: 82 MMHG | HEART RATE: 76 BPM

## 2025-05-12 DIAGNOSIS — I48.92 ATRIAL FLUTTER WITH CONTROLLED RESPONSE: ICD-10-CM

## 2025-05-12 PROBLEM — I48.3 TYPICAL ATRIAL FLUTTER: Status: ACTIVE | Noted: 2025-05-12

## 2025-05-12 PROCEDURE — 93010 ELECTROCARDIOGRAM REPORT: CPT | Performed by: STUDENT IN AN ORGANIZED HEALTH CARE EDUCATION/TRAINING PROGRAM

## 2025-05-12 PROCEDURE — 93005 ELECTROCARDIOGRAM TRACING: CPT | Performed by: NURSE PRACTITIONER

## 2025-05-12 PROCEDURE — 92960 CARDIOVERSION ELECTRIC EXT: CPT

## 2025-05-12 PROCEDURE — 92960 CARDIOVERSION ELECTRIC EXT: CPT | Performed by: INTERNAL MEDICINE

## 2025-05-12 RX ORDER — SODIUM CHLORIDE 0.9 % (FLUSH) 0.9 %
10 SYRINGE (ML) INJECTION EVERY 12 HOURS SCHEDULED
Status: DISCONTINUED | OUTPATIENT
Start: 2025-05-12 | End: 2025-05-13 | Stop reason: HOSPADM

## 2025-05-12 RX ORDER — SODIUM CHLORIDE 0.9 % (FLUSH) 0.9 %
10 SYRINGE (ML) INJECTION AS NEEDED
Status: DISCONTINUED | OUTPATIENT
Start: 2025-05-12 | End: 2025-05-13 | Stop reason: HOSPADM

## 2025-05-12 RX ORDER — SODIUM CHLORIDE 9 MG/ML
40 INJECTION, SOLUTION INTRAVENOUS AS NEEDED
Status: DISCONTINUED | OUTPATIENT
Start: 2025-05-12 | End: 2025-05-13 | Stop reason: HOSPADM

## 2025-05-12 NOTE — ANESTHESIA PREPROCEDURE EVALUATION
Anesthesia Evaluation     Patient summary reviewed   no history of anesthetic complications:   NPO Solid Status: > 8 hours  NPO Liquid Status: > 6 hours           Airway   Mallampati: II  TM distance: >3 FB  Neck ROM: full  Dental      Pulmonary - negative pulmonary ROS   Cardiovascular   Exercise tolerance: good (4-7 METS)    PT is on anticoagulation therapy    (+) hypertension, dysrhythmias Atrial Fib, hyperlipidemia  (-) pacemaker      Neuro/Psych  (+) dizziness/light headedness  GI/Hepatic/Renal/Endo    (+) GERD, PUD    Musculoskeletal     Abdominal    Substance History      OB/GYN          Other   arthritis,                       Anesthesia Plan    ASA 3     MAC     (Blood thinner held )  intravenous induction     Anesthetic plan, risks, benefits, and alternatives have been provided, discussed and informed consent has been obtained with: patient and spouse/significant other.

## 2025-05-12 NOTE — ANESTHESIA POSTPROCEDURE EVALUATION
"Patient: Bobby Christina    Procedure Summary       Date: 05/12/25 Room / Location: [facility] CATH LAB    Anesthesia Start: 0943 Anesthesia Stop: 1011    Procedure: CARDIOVERSION EXTERNAL IN CARDIOLOGY DEPARTMENT Diagnosis:       Atrial flutter with controlled response      (symptomatic afib)    Scheduled Providers: Gianni Krause MD Provider: ANNIE Jauregui CRNA    Anesthesia Type: MAC ASA Status: 3            Anesthesia Type: MAC    Vitals  Vitals Value Taken Time   /82 05/12/25 10:31   Temp     Pulse 70 05/12/25 10:40   Resp 14 05/12/25 10:30   SpO2 97 % 05/12/25 10:40   Vitals shown include unfiled device data.        Post Anesthesia Care and Evaluation    Patient location during evaluation: PHASE II  Patient participation: complete - patient participated  Level of consciousness: awake and alert  Pain management: adequate    Airway patency: patent  Anesthetic complications: No anesthetic complications  PONV Status: none  Cardiovascular status: acceptable and hemodynamically stable  Respiratory status: acceptable  Hydration status: acceptable    Comments: Blood pressure 124/82, pulse 72, temperature 98 °F (36.7 °C), temperature source Temporal, resp. rate 14, height 188 cm (74\"), weight 90.3 kg (199 lb), SpO2 96%.    Patient discharged from PACU based upon Jose Armando score. Please see RN notes for further details    "

## 2025-05-13 LAB
QT INTERVAL: 378 MS
QT INTERVAL: 396 MS
QTC INTERVAL: 413 MS
QTC INTERVAL: 439 MS

## 2025-06-12 ENCOUNTER — OFFICE VISIT (OUTPATIENT)
Dept: CARDIOLOGY | Facility: CLINIC | Age: 75
End: 2025-06-12
Payer: MEDICARE

## 2025-06-12 VITALS
SYSTOLIC BLOOD PRESSURE: 136 MMHG | BODY MASS INDEX: 25.93 KG/M2 | OXYGEN SATURATION: 95 % | DIASTOLIC BLOOD PRESSURE: 88 MMHG | WEIGHT: 202 LBS | HEIGHT: 74 IN | HEART RATE: 69 BPM

## 2025-06-12 DIAGNOSIS — I48.3 TYPICAL ATRIAL FLUTTER: Primary | ICD-10-CM

## 2025-06-12 DIAGNOSIS — I48.0 PAF (PAROXYSMAL ATRIAL FIBRILLATION): Chronic | ICD-10-CM

## 2025-06-12 DIAGNOSIS — Z79.01 CHRONIC ANTICOAGULATION: Chronic | ICD-10-CM

## 2025-06-12 NOTE — PROGRESS NOTES
"     Subjective:     Encounter Date: 6/12/2025      Patient ID: Bobby Christina is a 74 y.o. male.    Chief Complaint: follow up aflutter, afib s/p CV     History of Present Illness    The patient presents to follow-up regarding his paroxysmal atrial fibrillation and persistent atrial flutter.  He was diagnosed back in 2015 and was reportedly asymptomatic.  His digoxin was discontinued in July 2023 and when he came back for follow-up in March 2024 he was still doing well and maintaining normal sinus rhythm.  He is anticoagulated with Xarelto.    I saw the patient in late March 2025 and he reported 8 to 9 months prior his wife had been diagnosed with breast cancer and he had a significant lifestyle change.  He reported increased stress but was doing pretty well.  He thought he might be more fatigued in recent months and reported \"no enthusiasm.\"  He was not having any chest pain, shortness of breath or palpitations.  He was noted to be in rate controlled atrial flutter.  I placed him in a Holter monitor and this revealed persistent atrial flutter with heart rates in the 70s.    After 4 weeks of uninterrupted anticoagulation with Xarelto he was successfully cardioverted on 5/12.  Of note, initial shock converted him from atrial flutter to atrial fibrillation and he required 2 more shocks before converting from atrial fibrillation to normal sinus rhythm.    He presents today to follow-up after his cardioversion.    Today he states he is feeling much better after the cardioversion and has maintained this improvement.  He states he has more energy.  He states he is reading again, which is a sign that he is feeling much better.  He reports compliance with his Xarelto with food.  He denies any chest pain or shortness of breath.  He denies palpitations.  He states he generally does not like taking pills but he is open to treatment options moving forward.      The following portions of the patient's history were reviewed and " updated as appropriate: allergies, current medications, past family history, past medical history, past social history, past surgical history, and problem list.    Review of Systems   Constitutional: Negative for malaise/fatigue.   Cardiovascular:  Negative for chest pain, claudication, dyspnea on exertion, leg swelling, near-syncope, orthopnea, palpitations, paroxysmal nocturnal dyspnea and syncope.   Respiratory:  Negative for cough and shortness of breath.    Hematologic/Lymphatic: Does not bruise/bleed easily.   Musculoskeletal:  Negative for falls.   Gastrointestinal:  Negative for bloating.   Neurological:  Negative for dizziness, light-headedness and weakness.           Current Outpatient Medications:     rivaroxaban (XARELTO) 20 MG tablet, Take 1 tablet by mouth Daily With Lunch., Disp: 90 tablet, Rfl: 3    tamsulosin (FLOMAX) 0.4 MG capsule 24 hr capsule, Take 1 capsule by mouth Every Night., Disp: , Rfl:     therapeutic multivitamin-minerals (THERAGRAN-M) tablet, Take 1 tablet by mouth Daily., Disp: , Rfl:        Objective:      Vitals:    06/12/25 0823   BP: 136/88   Pulse: 69   SpO2: 95%     Weight - 202 lbs     Vitals and nursing note reviewed.   Constitutional:       General: Not in acute distress.     Appearance: Well-developed and not in distress. Not diaphoretic.   Neck:      Vascular: No JVD or JVR. JVD normal.   Pulmonary:      Effort: Pulmonary effort is normal. No respiratory distress.      Breath sounds: Normal breath sounds.   Cardiovascular:      Normal rate. Regular rhythm.      Murmurs: There is no murmur.   Edema:     Peripheral edema absent.   Abdominal:      Tenderness: There is no abdominal tenderness.   Skin:     General: Skin is warm and dry.   Neurological:      Mental Status: Alert, oriented to person, place, and time and oriented to person, place and time.       Lab Review:   Lab Results   Component Value Date    GLUCOSE 96 02/26/2015    BUN 11 02/26/2015    CREATININE 0.89  "02/26/2015     02/26/2015    K 4.5 02/26/2015     02/26/2015    CALCIUM 9.5 02/26/2015    ALT 27 12/03/2020    AST 24 12/03/2020    ANIONGAP 9 02/26/2015    EGFR >60 02/26/2015        Lab Results   Component Value Date    CHLPL 232 (H) 12/03/2020    TRIG 85 12/03/2020    HDL 56 12/03/2020     12/03/2020        No results found for: \"HGBA1C\"     Lab Results   Component Value Date    TSH 4.14 02/26/2015            ECG 12 Lead    Date/Time: 6/12/2025 8:45 AM  Performed by: Rhoda Yan APRN    Authorized by: Rhoda Yan APRN  Comparison: compared with previous ECG from 5/12/2025  Similar to previous ECG  Rhythm: sinus rhythm  BPM: 69  Conduction: 1st degree AV block  Other findings: low voltage    Clinical impression: abnormal EKG          3/2025 7 day holter:      An abnormal monitor study.    100% burden of atrial flutter.  Average ventricular rate 72 bpm (range ).    Patient never triggered the device or reported any symptoms.          5/12/2025 CV:      Patient presented with atrial flutter, with 4:1 AV conduction.    Initial cardioversion, synchronized using 50 J, successfully terminated atrial flutter, but atrial fibrillation was then evident.    Initial attempt to cardiovert atrial fibrillation, synchronized using 200 J, was unsuccessful.    Second attempt to cardiovert atrial fibrillation, synchronized using 300 J, with successful and restored sinus rhythm.    Continue anticoagulation without interruption.    Follow-up in office in 2-4 weeks.  Assessment/Plan:     Problem List Items Addressed This Visit          Cardiac and Vasculature    PAF (paroxysmal atrial fibrillation) (Chronic)    Typical atrial flutter - Primary       Coag and Thromboembolic    Chronic anticoagulation (Chronic)    Overview   Formatting of this note might be different from the original.  Xarelto              Recommendations/plans:    The patient is doing well after his cardioversion for atrial flutter and " atrial fibrillation.  He had symptomatic improvement in the form of significantly improved energy level.  He will continue his Xarelto 20 mg daily with food for stroke prophylaxis.  He does not require rate controlling medication at this time.  We discussed options moving forward including watchful waiting, addition of antiarrhythmic to prevent AF/AFL, or referral to electrophysiology to discuss the possibility of ablation.  At this time, no changes will be made but if he has a recurrence of symptoms and recurrent atrial fibrillation or flutter is confirmed we will plan to either refer to EP or add an antiarrhythmic with plans for repeat cardioversion if the atrial arrhythmia is persistent again.  I went ahead and ordered a 2D echocardiogram to assess for any structural abnormalities so that we may know which antiarrhythmics he would be a candidate for if he needs one in the future.  He voices understanding and is in agreement with the plan of care.    He will follow-up in 6 months, but call sooner with symptoms or concerns.    I spent 37 minutes caring for Bobby on this date of service. This time includes time spent by me in the following activities: preparing for the visit, reviewing tests, performing a medically appropriate examination and/or evaluation, counseling and educating the patient/family/caregiver, and documenting information in the medical record

## 2025-06-17 ENCOUNTER — OFFICE VISIT (OUTPATIENT)
Dept: GASTROENTEROLOGY | Facility: CLINIC | Age: 75
End: 2025-06-17
Payer: MEDICARE

## 2025-06-17 VITALS
HEIGHT: 74 IN | DIASTOLIC BLOOD PRESSURE: 80 MMHG | HEART RATE: 76 BPM | TEMPERATURE: 97.7 F | OXYGEN SATURATION: 97 % | SYSTOLIC BLOOD PRESSURE: 136 MMHG | WEIGHT: 197 LBS | BODY MASS INDEX: 25.28 KG/M2

## 2025-06-17 DIAGNOSIS — K21.00 GASTROESOPHAGEAL REFLUX DISEASE WITH ESOPHAGITIS WITHOUT HEMORRHAGE: ICD-10-CM

## 2025-06-17 DIAGNOSIS — K22.70 BARRETT'S ESOPHAGUS WITHOUT DYSPLASIA: Primary | ICD-10-CM

## 2025-06-17 DIAGNOSIS — Z79.01 ANTICOAGULATED: ICD-10-CM

## 2025-06-17 DIAGNOSIS — I10 HTN (HYPERTENSION), BENIGN: ICD-10-CM

## 2025-06-17 PROCEDURE — 3075F SYST BP GE 130 - 139MM HG: CPT | Performed by: CLINICAL NURSE SPECIALIST

## 2025-06-17 PROCEDURE — 3079F DIAST BP 80-89 MM HG: CPT | Performed by: CLINICAL NURSE SPECIALIST

## 2025-06-17 PROCEDURE — 1160F RVW MEDS BY RX/DR IN RCRD: CPT | Performed by: CLINICAL NURSE SPECIALIST

## 2025-06-17 PROCEDURE — 1159F MED LIST DOCD IN RCRD: CPT | Performed by: CLINICAL NURSE SPECIALIST

## 2025-06-17 NOTE — PROGRESS NOTES
Chief Complaint   Patient presents with    GI Problem     Endo recall-lange's     Subjective   HPI  Bobby Christina is a 74 y.o. male who presents with hx of Barretts esophagus determined by biopsy. Course is constant.  Disease is stable, maintains on half a teaspoon of baking soda for the management of this. Last Endoscopy reviewed. He has no complaints of nausea or vomiting. No change in bowels. No wt loss. No BRBPR. No melena. No abdominal pain or dysphagia.    Past Medical History:   Diagnosis Date    Abnormal ECG     Allergic rhinitis     Arrhythmia     Arthritis     Atrial fibrillation     Lange's esophagus     GERD (gastroesophageal reflux disease)     Hx of colonic polyps     Hyperlipidemia     Hypertension      Past Surgical History:   Procedure Laterality Date    COLONOSCOPY N/A 03/01/2022    Dr. rapp-One 6 mm adenomatous polyp in the transverse colon, removed with a hot snare. Resected and retrieved. - One 5 mm adenomatous polyp at 30 cm proximal to the anus, removed with a cold snare. Resected and retrieved. - Diverticulosis in the sigmoid colon. - External hemorrhoids.    COLONOSCOPY W/ POLYPECTOMY  01/19/2015    Tubular adenomatous polyp ascending colon, Polyp at 40 cm insufficient tissue for diagnosis, Diverticulosis repeat exam in 5 years    CYST REMOVAL      ENDOSCOPY  03/23/2015    Intestinal Metaplasia mild to moderate chronic inflammation, HH repeat exam in 3 years    ENDOSCOPY N/A 03/01/2022    Dr. rapp-- Normal examined duodenum. - Multiple fundic gland polyps. - Esophageal mucosal changes consistent with long-segment Lange's esophagus. Biopsied.-   Fragments of benign squamous mucosa, benign squamocolumnar junction mucosa and benign glandular mucosa demonstrating intestinal metaplasia (Lange's esophagus). B. Chronic inflammation, mild. C. No dysplasia identified    UPPER GASTROINTESTINAL ENDOSCOPY  12/13/2010    YEHUDA Dodge           Current Outpatient Medications:     rivaroxaban  (XARELTO) 20 MG tablet, Take 1 tablet by mouth Daily With Lunch., Disp: 90 tablet, Rfl: 3    tamsulosin (FLOMAX) 0.4 MG capsule 24 hr capsule, Take 1 capsule by mouth Every Night., Disp: , Rfl:     therapeutic multivitamin-minerals (THERAGRAN-M) tablet, Take 1 tablet by mouth Daily., Disp: , Rfl:   Allergies   Allergen Reactions    Alprazolam Unknown - Low Severity    Atenolol Unknown - Low Severity    Statins Other (See Comments)     Muscle weakness bilat lower legs  Dr William reported 'severe myopathy'     Social History     Socioeconomic History    Marital status:    Tobacco Use    Smoking status: Never    Smokeless tobacco: Never   Vaping Use    Vaping status: Never Used   Substance and Sexual Activity    Alcohol use: Not Currently     Comment: occ    Drug use: Never    Sexual activity: Defer     Family History   Problem Relation Age of Onset    Colon polyps Brother     Ulcerative colitis Brother     Colon cancer Neg Hx      Health Maintenance   Topic Date Due    TDAP/TD VACCINES (1 - Tdap) Never done    HEPATITIS C SCREENING  Never done    ANNUAL WELLNESS VISIT  12/19/2020    Pneumococcal Vaccine 50+ (2 of 2 - PPSV23) 12/03/2021    LIPID PANEL  12/03/2021    RSV Vaccine - Adults (1 - 1-dose 75+ series) Never done    COVID-19 Vaccine (4 - 2024-25 season) 09/01/2024    INFLUENZA VACCINE  07/01/2025    COLORECTAL CANCER SCREENING  03/01/2027    ZOSTER VACCINE  Completed     Review of Systems   Constitutional:  Negative for activity change, appetite change, chills, diaphoresis, fatigue, fever and unexpected weight change.   HENT:  Negative for ear pain, hearing loss, mouth sores, sore throat, trouble swallowing and voice change.    Eyes: Negative.    Respiratory:  Negative for cough, choking, shortness of breath and wheezing.    Cardiovascular:  Negative for chest pain and palpitations.   Gastrointestinal:  Negative for abdominal pain, blood in stool, constipation, diarrhea, nausea and vomiting.   Endocrine:  "Negative for cold intolerance and heat intolerance.   Genitourinary:  Negative for decreased urine volume, dysuria, frequency, hematuria and urgency.   Musculoskeletal:  Negative for back pain, gait problem and myalgias.   Skin:  Negative for color change, pallor and rash.   Allergic/Immunologic: Negative for food allergies and immunocompromised state.   Neurological:  Negative for dizziness, tremors, seizures, syncope, weakness, light-headedness, numbness and headaches.   Hematological:  Negative for adenopathy. Does not bruise/bleed easily.   Psychiatric/Behavioral:  Negative for agitation and confusion. The patient is not nervous/anxious.    All other systems reviewed and are negative.    Objective   Vitals:    06/17/25 0843   BP: 136/80   BP Location: Left arm   Pulse: 76   Temp: 97.7 °F (36.5 °C)   TempSrc: Temporal   SpO2: 97%   Weight: 89.4 kg (197 lb)   Height: 188 cm (74.02\")     Body mass index is 25.28 kg/m².    Physical Exam  Constitutional:       Appearance: He is well-developed.   HENT:      Head: Normocephalic and atraumatic.   Eyes:      Pupils: Pupils are equal, round, and reactive to light.   Neck:      Trachea: No tracheal deviation.   Cardiovascular:      Rate and Rhythm: Normal rate and regular rhythm.      Heart sounds: Normal heart sounds. No murmur heard.     No friction rub. No gallop.   Pulmonary:      Effort: Pulmonary effort is normal. No respiratory distress.      Breath sounds: Normal breath sounds. No wheezing or rales.   Chest:      Chest wall: No tenderness.   Abdominal:      General: Bowel sounds are normal. There is no distension.      Palpations: Abdomen is soft. Abdomen is not rigid.      Tenderness: There is no abdominal tenderness. There is no guarding or rebound.   Musculoskeletal:         General: No tenderness or deformity. Normal range of motion.      Cervical back: Normal range of motion and neck supple.   Skin:     General: Skin is warm and dry.      Coloration: Skin is " not pale.      Findings: No rash.   Neurological:      Mental Status: He is alert and oriented to person, place, and time.      Deep Tendon Reflexes: Reflexes are normal and symmetric.   Psychiatric:         Behavior: Behavior normal.         Thought Content: Thought content normal.         Judgment: Judgment normal.         Assessment & Plan   Diagnoses and all orders for this visit:    1. Garza's esophagus without dysplasia (Primary)  -     Case Request; Standing  -     Case Request    2. Gastroesophageal reflux disease with esophagitis without hemorrhage    3. HTN (hypertension), benign  Comments:  cont BP medication the day of procedure    4. Anticoagulated  Comments:  Xarelto to hold per Rhoda Yan, he takes for Afib    Other orders  -     Implement Anesthesia Orders Day of Procedure; Standing  -     Follow Anesthesia Guidelines / Protocol; Future  -     Obtain Informed Consent; Standing      ESOPHAGOGASTRODUODENOSCOPY WITH ANESTHESIA (N/A)  BMI is >= 25 and <30. (Overweight) The following options were offered after discussion;: weight loss educational material (shared in after visit summary)      GUSTAVO Zhong  6/17/2025  09:22 CDT      IF YOU SMOKE OR USE TOBACCO PLEASE READ THE FOLLOWING:   3.5 minutes provided    Why is smoking bad for me?  Smoking increases the risk of heart disease, lung disease, vascular disease, stroke, and cancer.     If you smoke, STOP!    If you would like more information on quitting smoking, please visit the [employer] website: [email or web address]   This link will provide additional resources including the QUIT line and the [employer] support groups.     For more information:    Quit Now [place]  1-800-QUIT-NOW  https://[facility].quitlogix.org/en-[place]/    Obesity, Adult  Obesity is having too much body fat. If you have a BMI of 30 or more, you are obese. BMI is a number that explains how much body fat you have. Obesity  is often caused by taking in (consuming) more calories than your body uses.  Obesity can cause serious health problems. Changing your lifestyle can help to treat obesity.  Follow these instructions at home:  Eating and drinking     Follow advice from your doctor about what to eat and drink. Your doctor may tell you to:  Cut down on (limit) fast foods, sweets, and processed snack foods.  Choose low-fat options. For example, choose low-fat milk instead of whole milk.  Eat 5 or more servings of fruits or vegetables every day.  Eat at home more often. This gives you more control over what you eat.  Choose healthy foods when you eat out.  Learn what a healthy portion size is. A portion size is the amount of a certain food that is healthy for you to eat at one time. This is different for each person.  Keep low-fat snacks available.  Avoid sugary drinks. These include soda, fruit juice, iced tea that is sweetened with sugar, and flavored milk.  Eat a healthy breakfast.  Drink enough water to keep your pee (urine) clear or pale yellow.  Do not go without eating for long periods of time (do not fast).  Do not go on popular or trendy diets (fad diets).  Physical Activity   Exercise often, as told by your doctor. Ask your doctor:  What types of exercise are safe for you.  How often you should exercise.  Warm up and stretch before being active.  Do slow stretching after being active (cool down).  Rest between times of being active.  Lifestyle   Limit how much time you spend in front of your TV, computer, or video game system (be less sedentary).  Find ways to reward yourself that do not involve food.  Limit alcohol intake to no more than 1 drink a day for nonpregnant women and 2 drinks a day for men. One drink equals 12 oz of beer, 5 oz of wine, or 1½ oz of hard liquor.  General instructions   Keep a weight loss journal. This can help you keep track of:  The food that you eat.  The exercise that you do.  Take over-the-counter  and prescription medicines only as told by your doctor.  Take vitamins and supplements only as told by your doctor.  Think about joining a support group. Your doctor may be able to help with this.  Keep all follow-up visits as told by your doctor. This is important.  Contact a doctor if:  You cannot meet your weight loss goal after you have changed your diet and lifestyle for 6 weeks.  This information is not intended to replace advice given to you by your health care provider. Make sure you discuss any questions you have with your health care provider.  Document Released: 03/11/2013 Document Revised: 05/25/2017 Document Reviewed: 10/05/2016  [employer] Interactive Patient Education © 2017 [employer]

## 2025-06-24 ENCOUNTER — TELEPHONE (OUTPATIENT)
Dept: GASTROENTEROLOGY | Facility: CLINIC | Age: 75
End: 2025-06-24
Payer: MEDICARE

## 2025-06-24 ENCOUNTER — TELEPHONE (OUTPATIENT)
Dept: GASTROENTEROLOGY | Facility: CLINIC | Age: 75
End: 2025-06-24

## 2025-06-24 ENCOUNTER — TELEPHONE (OUTPATIENT)
Dept: CARDIOLOGY | Facility: CLINIC | Age: 75
End: 2025-06-24
Payer: MEDICARE

## 2025-06-24 NOTE — TELEPHONE ENCOUNTER
----- Message from Rhoda Yan sent at 6/23/2025  7:06 AM CDT -----  Okay to hold Xarelto.  How long to do so is up to the physician performing the procedure.  Our general recommendation is to hold this 24 to 48 hours prior and resume when felt safe to do so afterward.  He does not take aspirin.  He does not require Lovenox.  ----- Message -----  From: Homero Rucker MA  Sent: 6/17/2025   9:19 AM CDT  To: GUSTAVO Nettles

## 2025-06-24 NOTE — TELEPHONE ENCOUNTER
Provider: DR LINETTE MERIDA     Caller: MARK ZIMMERMAN    Relationship to Patient: SELF    Phone Number: [phone]     Reason for Call: PT WOULD LIKE HIS INSTRUCTIONS MAILED TO HIM FOR HIS EGD ON 7/30/25 PLEASE [address] [id number]

## 2025-07-21 ENCOUNTER — HOSPITAL ENCOUNTER (OUTPATIENT)
Dept: CARDIOLOGY | Facility: HOSPITAL | Age: 75
Discharge: HOME | End: 2025-07-21
Admitting: NURSE PRACTITIONER
Payer: MEDICARE

## 2025-07-21 VITALS
BODY MASS INDEX: 25.28 KG/M2 | HEIGHT: 74 IN | DIASTOLIC BLOOD PRESSURE: 80 MMHG | SYSTOLIC BLOOD PRESSURE: 136 MMHG | WEIGHT: 197 LBS

## 2025-07-21 DIAGNOSIS — I48.3 TYPICAL ATRIAL FLUTTER: ICD-10-CM

## 2025-07-21 DIAGNOSIS — I48.0 PAF (PAROXYSMAL ATRIAL FIBRILLATION): Chronic | ICD-10-CM

## 2025-07-21 PROCEDURE — 93306 TTE W/DOPPLER COMPLETE: CPT

## 2025-07-21 PROCEDURE — 93356 MYOCRD STRAIN IMG SPCKL TRCK: CPT

## 2025-07-24 LAB
AORTIC ARCH: 3.2 CM
AORTIC DIMENSIONLESS INDEX: 0.82 (DI)
AV MEAN PRESS GRAD SYS DOP V1V2: 3 MMHG
AV VMAX SYS DOP: 113.2 CM/SEC
BH CV ECHO LEFT VENTRICLE GLOBAL LONGITUDINAL STRAIN: -15.3 %
BH CV ECHO MEAS - 2D AUTO EF SIEMENS: 58.9 %
BH CV ECHO MEAS - AO MAX PG: 5.1 MMHG
BH CV ECHO MEAS - AO ROOT DIAM: 3.9 CM
BH CV ECHO MEAS - AO V2 VTI: 22.9 CM
BH CV ECHO MEAS - AVA(I,D): 3.2 CM2
BH CV ECHO MEAS - EDV(CUBED): 59.3 ML
BH CV ECHO MEAS - EDV(MOD-SP2): 61.9 ML
BH CV ECHO MEAS - EDV(MOD-SP4): 63.6 ML
BH CV ECHO MEAS - EF(MOD-SP2): 62.3 %
BH CV ECHO MEAS - EF(MOD-SP4): 58.2 %
BH CV ECHO MEAS - ESV(CUBED): 17.8 ML
BH CV ECHO MEAS - ESV(MOD-SP2): 23.3 ML
BH CV ECHO MEAS - ESV(MOD-SP4): 26.6 ML
BH CV ECHO MEAS - FS: 33 %
BH CV ECHO MEAS - IVS/LVPW: 1.6 CM
BH CV ECHO MEAS - IVSD: 1.39 CM
BH CV ECHO MEAS - LA DIMENSION: 4.3 CM
BH CV ECHO MEAS - LAT PEAK E' VEL: 5.4 CM/SEC
BH CV ECHO MEAS - LV DIASTOLIC VOL/BSA (35-75): 29.5 CM2
BH CV ECHO MEAS - LV MASS(C)D: 146.3 GRAMS
BH CV ECHO MEAS - LV MAX PG: 3.4 MMHG
BH CV ECHO MEAS - LV MEAN PG: 1.48 MMHG
BH CV ECHO MEAS - LV SYSTOLIC VOL/BSA (12-30): 12.3 CM2
BH CV ECHO MEAS - LV V1 MAX: 91.9 CM/SEC
BH CV ECHO MEAS - LV V1 VTI: 18.7 CM
BH CV ECHO MEAS - LVIDD: 3.9 CM
BH CV ECHO MEAS - LVIDS: 2.6 CM
BH CV ECHO MEAS - LVOT AREA: 3.9 CM2
BH CV ECHO MEAS - LVOT DIAM: 2.22 CM
BH CV ECHO MEAS - LVPWD: 0.87 CM
BH CV ECHO MEAS - MED PEAK E' VEL: 5.5 CM/SEC
BH CV ECHO MEAS - MV A MAX VEL: 66.4 CM/SEC
BH CV ECHO MEAS - MV DEC SLOPE: 198.8 CM/SEC2
BH CV ECHO MEAS - MV DEC TIME: 0.23 SEC
BH CV ECHO MEAS - MV E MAX VEL: 46.6 CM/SEC
BH CV ECHO MEAS - MV E/A: 0.7
BH CV ECHO MEAS - MV MAX PG: 2.8 MMHG
BH CV ECHO MEAS - MV MEAN PG: 0.97 MMHG
BH CV ECHO MEAS - MV V2 VTI: 13.7 CM
BH CV ECHO MEAS - MVA(VTI): 5.3 CM2
BH CV ECHO MEAS - PA V2 MAX: 88.1 CM/SEC
BH CV ECHO MEAS - PULM A REVS DUR: 0.14 SEC
BH CV ECHO MEAS - PULM A REVS VEL: 91.7 CM/SEC
BH CV ECHO MEAS - RAP SYSTOLE: 10 MMHG
BH CV ECHO MEAS - RV MAX PG: 2.6 MMHG
BH CV ECHO MEAS - RV V1 MAX: 80.7 CM/SEC
BH CV ECHO MEAS - RV V1 VTI: 15 CM
BH CV ECHO MEAS - RVDD: 3.5 CM
BH CV ECHO MEAS - RVSP: 30.1 MMHG
BH CV ECHO MEAS - SV(LVOT): 72.6 ML
BH CV ECHO MEAS - SV(MOD-SP2): 38.6 ML
BH CV ECHO MEAS - SV(MOD-SP4): 37 ML
BH CV ECHO MEAS - SVI(LVOT): 33.6 ML/M2
BH CV ECHO MEAS - SVI(MOD-SP2): 17.9 ML/M2
BH CV ECHO MEAS - SVI(MOD-SP4): 17.1 ML/M2
BH CV ECHO MEAS - TAPSE (>1.6): 2.7 CM
BH CV ECHO MEAS - TR MAX PG: 20.1 MMHG
BH CV ECHO MEAS - TR MAX VEL: 224.4 CM/SEC
BH CV ECHO MEASUREMENTS AVERAGE E/E' RATIO: 8.55
BH CV XLRA - RV BASE: 3.8 CM
BH CV XLRA - RV LENGTH: 7.3 CM
BH CV XLRA - RV MID: 2.9 CM
BH CV XLRA - TDI S': 13 CM/SEC
LEFT ATRIUM VOLUME INDEX: 21 ML/M2
LEFT ATRIUM VOLUME: 43 ML
LV EF BIPLANE MOD: 59 %

## 2025-07-30 ENCOUNTER — ANESTHESIA (OUTPATIENT)
Dept: GASTROENTEROLOGY | Facility: HOSPITAL | Age: 75
End: 2025-07-30
Payer: MEDICARE

## 2025-07-30 ENCOUNTER — HOSPITAL ENCOUNTER (OUTPATIENT)
Facility: HOSPITAL | Age: 75
Setting detail: HOSPITAL OUTPATIENT SURGERY
Discharge: HOME | End: 2025-07-30
Attending: INTERNAL MEDICINE | Admitting: INTERNAL MEDICINE
Payer: MEDICARE

## 2025-07-30 ENCOUNTER — ANESTHESIA EVENT (OUTPATIENT)
Dept: GASTROENTEROLOGY | Facility: HOSPITAL | Age: 75
End: 2025-07-30
Payer: MEDICARE

## 2025-07-30 VITALS
WEIGHT: 200 LBS | OXYGEN SATURATION: 98 % | HEIGHT: 74 IN | SYSTOLIC BLOOD PRESSURE: 118 MMHG | TEMPERATURE: 97.1 F | RESPIRATION RATE: 12 BRPM | BODY MASS INDEX: 25.67 KG/M2 | DIASTOLIC BLOOD PRESSURE: 70 MMHG | HEART RATE: 65 BPM

## 2025-07-30 DIAGNOSIS — K22.70 BARRETT'S ESOPHAGUS WITHOUT DYSPLASIA: ICD-10-CM

## 2025-07-30 PROCEDURE — 43239 EGD BIOPSY SINGLE/MULTIPLE: CPT | Performed by: INTERNAL MEDICINE

## 2025-07-30 PROCEDURE — 25010000002 PROPOFOL 10 MG/ML EMULSION: Performed by: NURSE ANESTHETIST, CERTIFIED REGISTERED

## 2025-07-30 PROCEDURE — 25810000003 SODIUM CHLORIDE 0.9 % SOLUTION: Performed by: ANESTHESIOLOGY

## 2025-07-30 PROCEDURE — 25810000003 SODIUM CHLORIDE 0.9 % SOLUTION: Performed by: NURSE ANESTHETIST, CERTIFIED REGISTERED

## 2025-07-30 PROCEDURE — 88305 TISSUE EXAM BY PATHOLOGIST: CPT | Performed by: INTERNAL MEDICINE

## 2025-07-30 PROCEDURE — 25010000002 LIDOCAINE PF 2% 2 % SOLUTION: Performed by: NURSE ANESTHETIST, CERTIFIED REGISTERED

## 2025-07-30 RX ORDER — SODIUM CHLORIDE 9 MG/ML
INJECTION, SOLUTION INTRAVENOUS CONTINUOUS PRN
Status: DISCONTINUED | OUTPATIENT
Start: 2025-07-30 | End: 2025-07-30 | Stop reason: SURG

## 2025-07-30 RX ORDER — SUCRALFATE 1 G/1
1 TABLET ORAL
Qty: 90 TABLET | Refills: 0 | Status: SHIPPED | OUTPATIENT
Start: 2025-07-30

## 2025-07-30 RX ORDER — PROPOFOL 10 MG/ML
VIAL (ML) INTRAVENOUS AS NEEDED
Status: DISCONTINUED | OUTPATIENT
Start: 2025-07-30 | End: 2025-07-30 | Stop reason: SURG

## 2025-07-30 RX ORDER — SODIUM CHLORIDE 0.9 % (FLUSH) 0.9 %
10 SYRINGE (ML) INJECTION AS NEEDED
Status: DISCONTINUED | OUTPATIENT
Start: 2025-07-30 | End: 2025-07-30 | Stop reason: HOSPADM

## 2025-07-30 RX ORDER — SODIUM CHLORIDE 9 MG/ML
500 INJECTION, SOLUTION INTRAVENOUS ONCE
Status: COMPLETED | OUTPATIENT
Start: 2025-07-30 | End: 2025-07-30

## 2025-07-30 RX ORDER — LIDOCAINE HYDROCHLORIDE 20 MG/ML
INJECTION, SOLUTION EPIDURAL; INFILTRATION; INTRACAUDAL; PERINEURAL AS NEEDED
Status: DISCONTINUED | OUTPATIENT
Start: 2025-07-30 | End: 2025-07-30 | Stop reason: SURG

## 2025-07-30 RX ORDER — PANTOPRAZOLE SODIUM 40 MG/1
40 TABLET, DELAYED RELEASE ORAL DAILY
Qty: 30 TABLET | Refills: 11 | Status: SHIPPED | OUTPATIENT
Start: 2025-07-30

## 2025-07-30 RX ADMIN — SODIUM CHLORIDE 500 ML: 9 INJECTION, SOLUTION INTRAVENOUS at 09:12

## 2025-07-30 RX ADMIN — PROPOFOL 100 MG: 10 INJECTION, EMULSION INTRAVENOUS at 09:50

## 2025-07-30 RX ADMIN — SODIUM CHLORIDE: 9 INJECTION, SOLUTION INTRAVENOUS at 09:44

## 2025-07-30 RX ADMIN — LIDOCAINE HYDROCHLORIDE 200 MG: 20 INJECTION, SOLUTION EPIDURAL; INFILTRATION; INTRACAUDAL; PERINEURAL at 09:50

## 2025-07-30 NOTE — ANESTHESIA POSTPROCEDURE EVALUATION
"Patient: Bobby Christina    Procedure Summary       Date: 07/30/25 Room / Location: [facility] ENDOSCOPY 2 / [facility] ENDOSCOPY    Anesthesia Start: 0944 Anesthesia Stop: 1023    Procedure: ESOPHAGOGASTRODUODENOSCOPY WITH ANESTHESIA Diagnosis:       Garza's esophagus without dysplasia      (Garza's esophagus without dysplasia [K22.70])    Surgeons: Gosia Espinoza MD Provider: Atul Perales CRNA    Anesthesia Type: MAC ASA Status: 3            Anesthesia Type: MAC    Vitals  Vitals Value Taken Time   /67 07/30/25 10:21   Temp     Pulse 62 07/30/25 10:23   Resp 10 07/30/25 10:20   SpO2 97 % 07/30/25 10:23   Vitals shown include unfiled device data.        Post Anesthesia Care and Evaluation    Patient location during evaluation: PHASE II  Patient participation: complete - patient participated  Level of consciousness: awake and alert  Pain management: adequate    Airway patency: patent  Anesthetic complications: No anesthetic complications    Cardiovascular status: acceptable  Respiratory status: acceptable  Hydration status: acceptable    Comments: Blood pressure 104/67, pulse 69, temperature 97.1 °F (36.2 °C), temperature source Temporal, resp. rate 10, height 188 cm (74\"), weight 90.7 kg (200 lb), SpO2 97%.    Pt discharged from PACU based on marybel score >8    "

## 2025-07-30 NOTE — H&P
Chief Complaint:   Garza's esophagus    Subjective     HPI:   Patient has known salvaged Garza's esophagus.  Last endoscopy was 3 years ago.  Xarelto is on hold    Past Medical History:   Past Medical History:   Diagnosis Date    Abnormal ECG     Allergic rhinitis     Arrhythmia     Arthritis     Atrial fibrillation     Garza's esophagus     GERD (gastroesophageal reflux disease)     Hx of colonic polyps     Hyperlipidemia     Hypertension        Past Surgical History:  Past Surgical History:   Procedure Laterality Date    COLONOSCOPY N/A 03/01/2022    Dr. rapp-One 6 mm adenomatous polyp in the transverse colon, removed with a hot snare. Resected and retrieved. - One 5 mm adenomatous polyp at 30 cm proximal to the anus, removed with a cold snare. Resected and retrieved. - Diverticulosis in the sigmoid colon. - External hemorrhoids.    COLONOSCOPY W/ POLYPECTOMY  01/19/2015    Tubular adenomatous polyp ascending colon, Polyp at 40 cm insufficient tissue for diagnosis, Diverticulosis repeat exam in 5 years    CYST REMOVAL      ENDOSCOPY  03/23/2015    Intestinal Metaplasia mild to moderate chronic inflammation, HH repeat exam in 3 years    ENDOSCOPY N/A 03/01/2022    Dr. rapp-- Normal examined duodenum. - Multiple fundic gland polyps. - Esophageal mucosal changes consistent with long-segment Garza's esophagus. Biopsied.-   Fragments of benign squamous mucosa, benign squamocolumnar junction mucosa and benign glandular mucosa demonstrating intestinal metaplasia (Garza's esophagus). B. Chronic inflammation, mild. C. No dysplasia identified    UPPER GASTROINTESTINAL ENDOSCOPY  12/13/2010    YEHUDA Dodge       Family History:  Family History   Problem Relation Age of Onset    Colon polyps Brother     Ulcerative colitis Brother     Colon cancer Neg Hx        Social History:   reports that he has never smoked. He has never used smokeless tobacco. He reports that he does not currently use alcohol. He  "reports that he does not use drugs.    Medications:   Medications Prior to Admission   Medication Sig Dispense Refill Last Dose/Taking    rivaroxaban (XARELTO) 20 MG tablet Take 1 tablet by mouth Daily With Lunch. 90 tablet 3 7/27/2025    therapeutic multivitamin-minerals (THERAGRAN-M) tablet Take 1 tablet by mouth Daily.   7/27/2025       Allergies:  Alprazolam, Atenolol, and Statins    ROS:    Resp: No SOA  Cardiovascular: No CP      Objective     /83 (Patient Position: Sitting)   Pulse 71   Temp 97.1 °F (36.2 °C) (Temporal)   Resp 17   Ht 188 cm (74\")   Wt 90.7 kg (200 lb)   SpO2 95%   BMI 25.68 kg/m²     Physical Exam   Constitutional: Pt is oriented to person, place, and in no distress.   Pulmonary/Chest: No distress.  No audible wheezes  Psychiatric: Mood, memory, affect and judgment appear normal.     Assessment & Plan     Diagnosis:  Garza's esophagus    Anticipated Surgical Procedure:  Endoscopy    The risks, benefits, and alternatives of endoscopy were reviewed with the patient today.  Risks including perforation, with or without dilation, possibly requiring surgery.  Additional risks include risk of bleeding.  There is also the risk of a drug reaction or problems with anesthesia.  This will be discussed with the further by the anesthesia team on the day of the procedure. The benefits include the diagnosis and management of disease of the upper digestive tract.  Alternatives to endoscopy include upper GI series, laboratory testing, radiographic evaluation, or no intervention.  The patient verbalizes understanding and agrees.    Please note that portions of this note were completed with a voice recognition program.       "

## 2025-07-30 NOTE — ANESTHESIA PREPROCEDURE EVALUATION
Anesthesia Evaluation     Patient summary reviewed   no history of anesthetic complications:   NPO Solid Status: > 8 hours  NPO Liquid Status: > 8 hours           Airway   Mallampati: I  TM distance: >3 FB  Neck ROM: full  No difficulty expected  Dental - normal exam     Pulmonary - negative pulmonary ROS   (-) asthma, sleep apnea, not a smoker  Cardiovascular   Exercise tolerance: good (4-7 METS)    (+) dysrhythmias (s/p cardioversion) Paroxysmal Atrial Fib, hyperlipidemia      Neuro/Psych  (-) seizures, TIA, CVA  GI/Hepatic/Renal/Endo    (+) GERD  (-) liver disease, no renal disease, diabetes    Musculoskeletal     Abdominal    Substance History      OB/GYN          Other                    Anesthesia Plan    ASA 3     MAC     intravenous induction     Anesthetic plan, risks, benefits, and alternatives have been provided, discussed and informed consent has been obtained with: patient.    CODE STATUS:

## 2025-07-31 LAB
LAB AP CASE REPORT: NORMAL
Lab: NORMAL
PATH REPORT.FINAL DX SPEC: NORMAL
PATH REPORT.GROSS SPEC: NORMAL

## 2025-08-15 ENCOUNTER — OFFICE VISIT (OUTPATIENT)
Dept: UROLOGY | Facility: CLINIC | Age: 75
End: 2025-08-15
Payer: MEDICARE

## 2025-08-15 VITALS — BODY MASS INDEX: 26.56 KG/M2 | TEMPERATURE: 98.1 F | WEIGHT: 207 LBS | HEIGHT: 74 IN

## 2025-08-15 DIAGNOSIS — N40.0 BENIGN PROSTATIC HYPERPLASIA WITHOUT LOWER URINARY TRACT SYMPTOMS: Primary | ICD-10-CM

## 2025-08-15 LAB
BILIRUB BLD-MCNC: NEGATIVE MG/DL
CLARITY, POC: CLEAR
COLOR UR: YELLOW
GLUCOSE UR STRIP-MCNC: NEGATIVE MG/DL
KETONES UR QL: NEGATIVE
LEUKOCYTE EST, POC: NEGATIVE
NITRITE UR-MCNC: NEGATIVE MG/ML
PH UR: 6.5 [PH] (ref 5–8)
PROT UR STRIP-MCNC: NEGATIVE MG/DL
RBC # UR STRIP: NEGATIVE /UL
SP GR UR: 1.02 (ref 1–1.03)
UROBILINOGEN UR QL: NORMAL

## 2025-08-25 RX ORDER — SUCRALFATE 1 G/1
TABLET ORAL
Qty: 90 TABLET | Refills: 0 | OUTPATIENT
Start: 2025-08-25

## (undated) DEVICE — CONMED SCOPE SAVER BITE BLOCK, 20X27 MM: Brand: SCOPE SAVER

## (undated) DEVICE — TBG SMPL FLTR LINE NASL 02/C02 A/ BX/100

## (undated) DEVICE — THE CHANNEL CLEANING BRUSH IS A NYLON FLEXI BRUSH ATTACHED TO A FLEXIBLE PLASTIC SHEATH DESIGNED TO SAFELY REMOVE DEBRIS FROM FLEXIBLE ENDOSCOPES.

## (undated) DEVICE — MASK,OXYGEN,MED CONC,ADLT,7' TUB, UC: Brand: PENDING

## (undated) DEVICE — DEFENDO AIR WATER SUCTION AND BIOPSY VALVE KIT FOR  OLYMPUS: Brand: DEFENDO AIR/WATER/SUCTION AND BIOPSY VALVE

## (undated) DEVICE — CUFF,BP,DISP,1 TUBE,ADULT,HP: Brand: MEDLINE

## (undated) DEVICE — THE SINGLE USE ETRAP – POLYP TRAP IS USED FOR SUCTION RETRIEVAL OF ENDOSCOPICALLY REMOVED POLYPS.: Brand: ETRAP

## (undated) DEVICE — SNAR POLYP SENSATION MICRO OVL 13 240X40

## (undated) DEVICE — ENDOGATOR AUXILIARY WATER JET CONNECTOR: Brand: ENDOGATOR

## (undated) DEVICE — SENSR O2 OXIMAX FNGR A/ 18IN NONSTR

## (undated) DEVICE — YANKAUER,BULB TIP WITH VENT: Brand: ARGYLE

## (undated) DEVICE — FRCP BIOP ENDO CAPTURAPRO SPK SERR 2.8MM 230CM

## (undated) DEVICE — Device: Brand: DEFENDO AIR/WATER/SUCTION AND BIOPSY VALVE